# Patient Record
Sex: MALE | Race: WHITE | URBAN - METROPOLITAN AREA
[De-identification: names, ages, dates, MRNs, and addresses within clinical notes are randomized per-mention and may not be internally consistent; named-entity substitution may affect disease eponyms.]

---

## 2017-01-23 ENCOUNTER — PRE VISIT (OUTPATIENT)
Dept: CARDIOLOGY | Facility: CLINIC | Age: 52
End: 2017-01-23

## 2017-01-25 ENCOUNTER — OFFICE VISIT (OUTPATIENT)
Dept: CARDIOLOGY | Facility: CLINIC | Age: 52
End: 2017-01-25
Payer: COMMERCIAL

## 2017-01-25 VITALS
HEIGHT: 70 IN | BODY MASS INDEX: 45.1 KG/M2 | SYSTOLIC BLOOD PRESSURE: 138 MMHG | HEART RATE: 70 BPM | DIASTOLIC BLOOD PRESSURE: 72 MMHG | WEIGHT: 315 LBS

## 2017-01-25 DIAGNOSIS — I48.19 PERSISTENT ATRIAL FIBRILLATION (H): ICD-10-CM

## 2017-01-25 DIAGNOSIS — I51.9 DECREASED LEFT VENTRICULAR FUNCTION: ICD-10-CM

## 2017-01-25 DIAGNOSIS — I42.9 CARDIOMYOPATHY (H): Primary | ICD-10-CM

## 2017-01-25 PROCEDURE — 93000 ELECTROCARDIOGRAM COMPLETE: CPT | Performed by: INTERNAL MEDICINE

## 2017-01-25 PROCEDURE — 99214 OFFICE O/P EST MOD 30 MIN: CPT | Performed by: INTERNAL MEDICINE

## 2017-01-25 NOTE — PROGRESS NOTES
HISTORY OF PRESENT ILLNESS:    It was my pleasure seeing Mr. Michael Murphy in follow-up of atrial fibrillation, typical atrial flutter and tachycardia-induced cardiomyopathy.  He is a very pleasant 51-year-old male who also has history of DVT/PE related to factor V Leiden mutation and severe sleep apnea treated with CPAP.  He is severely overweight.      Because of the occurrence of tachycardia-induced cardiomyopathy with inability to maintain normal rhythm even on amiodarone therapy, he underwent catheter ablation in early 12/2016.  He had pulmonary vein isolation and right atrial isthmus ablation.  He was kept on amiodarone following the procedure.  He was started on carvedilol following the procedure at 12.5 mg b.i.d.  When Alexandra saw him in follow-up on 12/14/17, he was bradycardic in the 40s and the dose was halved to 6.25 mg bid.  Michael has remained on warfarin for anticoagulation.      In coming in today, Michael said he has felt well since the ablation.  He has had a few instances where he felt his heart may have been out of rhythm, typically lasting from seconds up to 2-3 minutes.  He has not had long episodes that he knows of.  His energy level has improved.  Lower extremity edema has improved as well.      He has no cough, unusual dyspnea, difficulty swallowing, fevers, or other concerning symptoms.      PHYSICAL EXAMINATION:   VITAL SIGNS:  Blood pressure 138/72, pulse 60 and regular, weight 154 kg.  Height 178 cm.   GENERAL:  He is a significantly overweight, pleasant male in no apparent distress.   HEENT:  Normocephalic, atraumatic.  Sclerae are anicteric.  Mouth, oropharynx clear.   NECK:  Supple, without apparent bruits.   LUNGS:  Completely clear.   CARDIOVASCULAR:  Regular rhythm.  No gallop, murmur or rub.   ABDOMEN:  Very obese, soft, nontender.   EXTREMITIES:  No more than trace edema.      DIAGNOSTIC STUDIES:    His 12-lead ECG today showed sinus rhythm at 58, small Q-waves in the inferior leads,  atrial conduction abnormality.      He informed me that his latest INR was 2.6.      IMPRESSION:    1. Atrial tachyarrhythmias with tachycardia-induced cardiomyopathy.  So far Michael has done well since his ablation procedure with no apparent complication.  He remains on amiodarone which was started in mid 2016.  Obviously, my desire is to keep him on amiodarone as briefly as possible given his young age and the multiple potential toxicities of this medication.  Currently, he is still in the healing phase following ablation.  Plan to repeat an echocardiogram in early March; if his ejection fraction has improved, switch amiodarone to flecainide.      RECOMMENDATIONS:   A.  Limited echocardiogram to reassess LV function has been requested for early 2017.   B.  I plan to see him in the clinic soon thereafter and discuss a change in his antiarrhythmic medications.   C.  Continue warfarin.   D.  For his cardiomyopathy, continue carvedilol and losartan.  His dose of carvedilol will not be increased because of previous bradycardia on a higher dose.   E.  Very important to lose weight and start a regular exercise program.  Without risk factor management he will not stay in normal rhythm long-term.       It was my pleasure seeing Mr. Wells today.      Time spent in clinic was 25 minutes, with greater than 50 percent of the time spent in discussion.         JOE VALERO MD, [facility]        cc:   [place] [address]          D: 2017 08:19   T: 2017 11:01   MT: MARIE      Name:     MICHAEL WELLS   MRN:      [id number]        Account:      [id number]   :      1965           Service Date: 2017      Document: [id number]

## 2017-01-25 NOTE — MR AVS SNAPSHOT
After Visit Summary   1/25/2017    Michael Murphy    MRN: [id number]           Patient Information     Date Of Birth          1965        Visit Information        Provider Department      1/25/2017 7:45 AM Nathan Hooper MD [facility]ART [facility]        Today's Diagnoses     Cardiomyopathy (H)    -  1     Decreased left ventricular function-EF 20-25% per echo 8-1-2016         Persistent atrial fibrillation (H)            Follow-ups after your visit        Additional Services     Follow-Up with Electrophysiologist                 Your next 10 appointments already scheduled     Mar 17, 2017  3:00 PM   Ech Limited with [facility] ([facility])    [address]  [facility] [address] [phone]           1.  Please bring or wear a comfortable two-piece outfit. 2.  You may eat, drink and take your normal medicines. 3.  For any questions that cannot be answered, please contact the ordering physician ***Please check-in at the [place] Registration Office located in [address] in the [facility] building. When you are finished registering, please go to [address] and have a seat. The technician will call your name for the test.            Mar 22, 2017  3:45 PM   [facility] EP RETURN with Nathan Hooper MD   [facility] HEART [facility] ([facility])    [address]  [facility] [address] [phone]              Future tests that were ordered for you today     Open Future Orders        Priority Expected Expires Ordered    Follow-Up with Electrophysiologist Routine 3/22/2017 1/25/2018 1/25/2017    EKG 12-lead complete w/read - Clinics Routine 3/22/2017 1/25/2018 1/25/2017    Echocardiogram Limited Routine 3/9/2017 1/25/2018 1/25/2017            Who to contact     If you have questions or need follow up information  "about today's clinic visit or your schedule please contact [facility] [place] directly at [phone].  Normal or non-critical lab and imaging results will be communicated to you by [employer], letter or phone within 4 business days after the clinic has received the results. If you do not hear from us within 7 days, please contact the clinic through [employer] or phone. If you have a critical or abnormal lab result, we will notify you by phone as soon as possible.  Submit refill requests through [email or web address] or call your pharmacy and they will forward the refill request to us. Please allow 3 business days for your refill to be completed.          Additional Information About Your Visit        [email or web address] Information     [email or web address] gives you secure access to your electronic health record. If you see a primary care provider, you can also send messages to your care team and make appointments. If you have questions, please call your primary care clinic.  If you do not have a primary care provider, please call [phone] and they will assist you.        Care EveryWhere ID     This is your Care EveryWhere ID. This could be used by other organizations to access your [facility] medical records  [id number]        Your Vitals Were     Pulse Height BMI (Body Mass Index)             70 1.778 m (5' 10\") 48.78 kg/m2          Blood Pressure from Last 3 Encounters:   01/25/17 138/72   12/14/16 122/80   12/06/16 129/63    Weight from Last 3 Encounters:   01/25/17 154.223 kg (340 lb)   12/14/16 153.588 kg (338 lb 9.6 oz)   12/06/16 157.353 kg (346 lb 14.4 oz)              We Performed the Following     EKG 12-lead complete w/read - Clinics (performed today)          Today's Medication Changes          These changes are accurate as of: 1/25/17  8:19 AM.  If you have any questions, ask your nurse or doctor.               Stop taking these medicines if you haven't already. Please contact your care team if you have " questions.     omeprazole 20 MG CR capsule   Commonly known as:  priLOSEC   Stopped by:  Nathan Hooper MD                    Primary Care Provider Office Phone # Fax #    Pacheco Andino [phone]       [facility] [address]  [place] [address]        Thank you!     Thank you for choosing [facility] AT [place]  for your care. Our goal is always to provide you with excellent care. Hearing back from our patients is one way we can continue to improve our services. Please take a few minutes to complete the written survey that you may receive in the mail after your visit with us. Thank you!             Your Updated Medication List - Protect others around you: Learn how to safely use, store and throw away your medicines at www.disposemymeds.org.          This list is accurate as of: 1/25/17  8:19 AM.  Always use your most recent med list.                   Brand Name Dispense Instructions for use    amiodarone 200 MG tablet    PACERONE/CODARONE     Take 1 tablet (200 mg) by mouth daily       carvedilol 6.25 MG tablet    COREG    180 tablet    Take 1 tablet (6.25 mg) by mouth 2 times daily (with meals)       furosemide 20 MG tablet    LASIX     Take 20 mg by mouth daily       losartan 25 MG tablet    COZAAR    90 tablet    Take 1 tablet (25 mg) by mouth daily       metFORMIN 500 MG tablet    GLUCOPHAGE    60 tablet    Take 1 tablet (500 mg) by mouth daily (with breakfast) Take 2 tabs (1000 mg) with supper       order for DME      DREAMSTATION   9-11 CM H20 FULL FACE AIRFIT F10 M       warfarin 1 MG tablet    COUMADIN    30 tablet    Take 10 mg on Monday and Thursday and 7.5 mg all other days.  INR check on 12/9.

## 2017-01-25 NOTE — LETTER
1/25/2017    Pacheco Andino MD  [facility]   [address] [facility] [address]    RE: Michael Murphy       Dear Colleague,    It was my pleasure seeing Mr. Michael Murphy in follow-up of atrial fibrillation, typical atrial flutter and tachycardia-induced cardiomyopathy.  Rei is a very pleasant 51-year-old male who also has history of DVT/PE related to factor V Leiden mutation and severe sleep apnea treated with CPAP.  He is severely overweight.      Because of the occurrence of tachycardia-induced cardiomyopathy with inability to maintain normal rhythm even on amiodarone therapy, he underwent catheter ablation in early 12/2016.  He had pulmonary vein isolation and right atrial isthmus ablation.  He was kept on amiodarone following the procedure.  He was started on carvedilol following the procedure at 12.5 mg b.i.d.  When Alexandra saw him in follow-up on 12/14/17, he was bradycardic in the 40s and the dose was halved to 6.25 mg bid.  Michael has remained on warfarin for anticoagulation.      In coming in today, Michael said he has felt well since the ablation.  He has had a few instances where he felt his heart may have been out of rhythm, typically lasting from seconds up to 2-3 minutes.  He has not had long episodes that he knows of.  His energy level has improved.  Lower extremity edema has improved as well.      He has no cough, unusual dyspnea, difficulty swallowing, fevers, or other concerning symptoms.      PHYSICAL EXAMINATION:   VITAL SIGNS:  Blood pressure 138/72, pulse 60 and regular, weight 154 kg.  Height 178 cm.   GENERAL:  He is a significantly overweight, pleasant male in no apparent distress.   HEENT:  Normocephalic, atraumatic.  Sclerae are anicteric.  Mouth, oropharynx clear.   NECK:  Supple, without apparent bruits.   LUNGS:  Completely clear.   CARDIOVASCULAR:  Regular rhythm.  No gallop, murmur or rub.   ABDOMEN:  Very obese, soft, nontender.   EXTREMITIES:  No more than trace edema.       DIAGNOSTIC STUDIES:    His 12-lead ECG today showed sinus rhythm at 58, small Q-waves in the inferior leads, atrial conduction abnormality.      He informed me that his latest INR was 2.6.      Outpatient Encounter Prescriptions as of 1/25/2017   Medication Sig Dispense Refill     carvedilol (COREG) 6.25 MG tablet Take 1 tablet (6.25 mg) by mouth 2 times daily (with meals) 180 tablet 3     amiodarone (PACERONE/CODARONE) 200 MG tablet Take 1 tablet (200 mg) by mouth daily       warfarin (COUMADIN) 1 MG tablet Take 10 mg on Monday and Thursday and 7.5 mg all other days.  INR check on 12/9. 30 tablet      furosemide (LASIX) 20 MG tablet Take 20 mg by mouth daily        losartan (COZAAR) 25 MG tablet Take 1 tablet (25 mg) by mouth daily 90 tablet 3     metFORMIN (GLUCOPHAGE) 500 MG tablet Take 1 tablet (500 mg) by mouth daily (with breakfast) Take 2 tabs (1000 mg) with supper 60 tablet      [DISCONTINUED] omeprazole (PRILOSEC) 20 MG CR capsule Take 1 capsule (20 mg) by mouth daily 30 capsule 0     order for DME DREAMSTATION     9-11 CM H20  FULL FACE AIRFIT F10 M       No facility-administered encounter medications on file as of 1/25/2017.      IMPRESSION:    1. Atrial tachyarrhythmias with tachycardia-induced cardiomyopathy.  So far Michael has done well since his ablation procedure with no apparent complication.  He remains on amiodarone which was started in mid 08/2016.  Obviously, my desire is to keep him on amiodarone as briefly as possible given his young age and the multiple potential toxicities of this medication.  Currently, he is still in the healing phase following ablation.  Plan to repeat an echocardiogram in early March; if his ejection fraction has improved, switch amiodarone to flecainide.      RECOMMENDATIONS:   A.  Limited echocardiogram to reassess LV function has been requested for early 03/2017.   B.  I plan to see him in the clinic soon thereafter and discuss a change in his antiarrhythmic  medications.   C.  Continue warfarin.   D.  For his cardiomyopathy, continue carvedilol and losartan.  His dose of carvedilol will not be increased because of previous bradycardia on a higher dose.   E.  Very important to lose weight and start a regular exercise program.  Without risk factor management he will not stay in normal rhythm long-term.       It was my pleasure seeing Mr. Murphy today.      Time spent in clinic was 25 minutes, with greater than 50 percent of the time spent in discussion.     Sincerely,    Nathan Hooper MD     [facility]

## 2017-01-25 NOTE — PROGRESS NOTES
HPI and Plan:   See dictation    Orders Placed This Encounter   Procedures     Follow-Up with Electrophysiologist     EKG 12-lead complete w/read - Clinics (performed today)     EKG 12-lead complete w/read - Clinics     Echocardiogram Limited       No orders of the defined types were placed in this encounter.       Medications Discontinued During This Encounter   Medication Reason     omeprazole (PRILOSEC) 20 MG CR capsule          Encounter Diagnoses   Name Primary?     Cardiomyopathy (H) Yes     Decreased left ventricular function-EF 20-25% per echo 8-1-2016      Persistent atrial fibrillation (H)        CURRENT MEDICATIONS:  Current Outpatient Prescriptions   Medication Sig Dispense Refill     carvedilol (COREG) 6.25 MG tablet Take 1 tablet (6.25 mg) by mouth 2 times daily (with meals) 180 tablet 3     amiodarone (PACERONE/CODARONE) 200 MG tablet Take 1 tablet (200 mg) by mouth daily       warfarin (COUMADIN) 1 MG tablet Take 10 mg on Monday and Thursday and 7.5 mg all other days.  INR check on 12/9. 30 tablet      furosemide (LASIX) 20 MG tablet Take 20 mg by mouth daily        losartan (COZAAR) 25 MG tablet Take 1 tablet (25 mg) by mouth daily 90 tablet 3     metFORMIN (GLUCOPHAGE) 500 MG tablet Take 1 tablet (500 mg) by mouth daily (with breakfast) Take 2 tabs (1000 mg) with supper 60 tablet      order for DME DREAMSTATION     9-11 CM H20  FULL FACE AIRFIT F10 M         ALLERGIES     Allergies   Allergen Reactions     Cephalosporins Rash       PAST MEDICAL HISTORY:  Past Medical History   Diagnosis Date     Cardiomyopathy (H)      Paroxysmal atrial fibrillation (H) 2007     Obesity      Decreased left ventricular function-EF 20-25% per echo 8-1-2016 8/8/2016     Factor V Leiden (H) 8/8/2016     Abnormal pulmonary function test-Hx of abnormal sleep test 8/8/2016     DVT (deep vein thrombosis) in pregnancy (H)      Pulmonary emboli (H) 2011     Atrial fibrillation (H) 12-5-2016     PVI ablation and RA isthmus  ablation     Sleep apnea        PAST SURGICAL HISTORY:  Past Surgical History   Procedure Laterality Date     Cardioversion  8/19/16     failed     Cardioversion  9/27/16     H ablation focal afib  12/5/16       FAMILY HISTORY:  Family History   Problem Relation Age of Onset     HEART DISEASE Mother      stent placed     HEART DISEASE Father      bypass x4     CANCER Father      lung       SOCIAL HISTORY:  Social History     Social History     Marital Status:      Spouse Name: N/A     Number of Children: N/A     Years of Education: N/A     Social History Main Topics     Smoking status: Never Smoker      Smokeless tobacco: None     Alcohol Use: Yes      Comment: occ     Drug Use: None     Sexual Activity: Not Asked     Other Topics Concern     Parent/Sibling W/ Cabg, Mi Or Angioplasty Before 65f 55m? No     Caffeine Concern No     occ. pop/tea daily     Sleep Concern No     Stress Concern Yes     Weight Concern Yes     trying to lose weight     Special Diet No     Exercise Yes     20-40 min, walking on treadmill daily     Seat Belt Yes     Social History Narrative       Review of Systems:  Skin:          Eyes:  Positive for glasses    ENT:  Negative for      Respiratory:  Positive for sleep apnea;CPAP     Cardiovascular:    palpitations;Positive for    Gastroenterology: Negative for      Genitourinary:         Musculoskeletal:         Neurologic:  Negative for      Psychiatric:  Negative for      Heme/Lymph/Imm:  Negative for      Endocrine:  Positive for   pre diabetic    [id number]

## 2017-03-09 LAB
ALBUMIN SERPL-MCNC: 3.9 G/DL
ALP SERPL-CCNC: 57 U/L
ALT SERPL-CCNC: 34 U/L
ANION GAP SERPL CALCULATED.3IONS-SCNC: ABNORMAL MMOL/L
AST SERPL-CCNC: 27 U/L
BILIRUB SERPL-MCNC: 0.8 MG/DL
BUN SERPL-MCNC: 15 MG/DL
CALCIUM SERPL-MCNC: ABNORMAL MG/DL
CHLORIDE SERPLBLD-SCNC: 97 MMOL/L
CO2 SERPL-SCNC: 26 MMOL/L
CREAT SERPL-MCNC: 1 MG/DL
GFR SERPL CREATININE-BSD FRML MDRD: ABNORMAL ML/MIN/1.73M2
GLUCOSE SERPL-MCNC: 107 MG/DL (ref 70–99)
POTASSIUM SERPL-SCNC: 4 MMOL/L
PROT SERPL-MCNC: 7.7 G/DL
SODIUM SERPL-SCNC: 137 MMOL/L

## 2017-03-14 ENCOUNTER — PRE VISIT (OUTPATIENT)
Dept: CARDIOLOGY | Facility: CLINIC | Age: 52
End: 2017-03-14

## 2017-03-17 ENCOUNTER — HOSPITAL ENCOUNTER (OUTPATIENT)
Dept: CARDIOLOGY | Facility: CLINIC | Age: 52
Discharge: HOME | End: 2017-03-17
Attending: INTERNAL MEDICINE | Admitting: INTERNAL MEDICINE
Payer: COMMERCIAL

## 2017-03-17 DIAGNOSIS — I48.19 PERSISTENT ATRIAL FIBRILLATION (H): ICD-10-CM

## 2017-03-17 PROCEDURE — 25500064 ZZH RX 255 OP 636: Performed by: INTERNAL MEDICINE

## 2017-03-17 PROCEDURE — 93325 DOPPLER ECHO COLOR FLOW MAPG: CPT | Mod: 26 | Performed by: INTERNAL MEDICINE

## 2017-03-17 PROCEDURE — 40000264 ECHO LIMITED WITH OPTISON

## 2017-03-17 PROCEDURE — 93308 TTE F-UP OR LMTD: CPT | Mod: 26 | Performed by: INTERNAL MEDICINE

## 2017-03-17 PROCEDURE — 93321 DOPPLER ECHO F-UP/LMTD STD: CPT | Mod: 26 | Performed by: INTERNAL MEDICINE

## 2017-03-17 RX ADMIN — HUMAN ALBUMIN MICROSPHERES AND PERFLUTREN 3 ML: 10; .22 INJECTION, SOLUTION INTRAVENOUS at 15:45

## 2017-03-22 ENCOUNTER — OFFICE VISIT (OUTPATIENT)
Dept: CARDIOLOGY | Facility: CLINIC | Age: 52
End: 2017-03-22
Payer: COMMERCIAL

## 2017-03-22 VITALS
DIASTOLIC BLOOD PRESSURE: 88 MMHG | WEIGHT: 315 LBS | BODY MASS INDEX: 45.1 KG/M2 | HEART RATE: 52 BPM | HEIGHT: 70 IN | SYSTOLIC BLOOD PRESSURE: 140 MMHG

## 2017-03-22 DIAGNOSIS — I48.19 PERSISTENT ATRIAL FIBRILLATION (H): ICD-10-CM

## 2017-03-22 DIAGNOSIS — I48.3 TYPICAL ATRIAL FLUTTER (H): Primary | ICD-10-CM

## 2017-03-22 DIAGNOSIS — I42.9 CARDIOMYOPATHY (H): ICD-10-CM

## 2017-03-22 PROCEDURE — 99214 OFFICE O/P EST MOD 30 MIN: CPT | Mod: 25 | Performed by: INTERNAL MEDICINE

## 2017-03-22 PROCEDURE — 93000 ELECTROCARDIOGRAM COMPLETE: CPT | Performed by: INTERNAL MEDICINE

## 2017-03-22 RX ORDER — FLECAINIDE ACETATE 100 MG/1
100 TABLET ORAL 2 TIMES DAILY
Qty: 180 TABLET | Refills: 3 | Status: SHIPPED | OUTPATIENT
Start: 2017-03-22 | End: 2018-02-26

## 2017-03-22 RX ORDER — SIMVASTATIN 40 MG
40 TABLET ORAL AT BEDTIME
COMMUNITY

## 2017-03-22 RX ORDER — SIMVASTATIN 40 MG
TABLET ORAL
Refills: 5 | COMMUNITY
Start: 2017-02-27 | End: 2017-03-22

## 2017-03-22 NOTE — PROGRESS NOTES
HPI and Plan:   See dictation    Orders Placed This Encounter   Procedures     Follow-Up with Electrophysiologist     Cholo Patch Monitor       Orders Placed This Encounter   Medications     DISCONTD: simvastatin (ZOCOR) 40 MG tablet     Sig: TK 1 T PO QHS     Refill:  5     simvastatin (ZOCOR) 40 MG tablet     Sig: Take 40 mg by mouth At Bedtime     flecainide (TAMBOCOR) 100 MG tablet     Sig: Take 1 tablet (100 mg) by mouth 2 times daily     Dispense:  180 tablet     Refill:  3     Start on 3/25/17       Medications Discontinued During This Encounter   Medication Reason     simvastatin (ZOCOR) 40 MG tablet Erroneous Entry     amiodarone (PACERONE/CODARONE) 200 MG tablet          Encounter Diagnosis   Name Primary?     Persistent atrial fibrillation (H)        CURRENT MEDICATIONS:  Current Outpatient Prescriptions   Medication Sig Dispense Refill     simvastatin (ZOCOR) 40 MG tablet Take 40 mg by mouth At Bedtime       flecainide (TAMBOCOR) 100 MG tablet Take 1 tablet (100 mg) by mouth 2 times daily 180 tablet 3     carvedilol (COREG) 6.25 MG tablet Take 1 tablet (6.25 mg) by mouth 2 times daily (with meals) 180 tablet 3     warfarin (COUMADIN) 1 MG tablet Take 10 mg on Monday and Thursday and 7.5 mg all other days.  INR check on 12/9. 30 tablet      furosemide (LASIX) 20 MG tablet Take 20 mg by mouth daily        losartan (COZAAR) 25 MG tablet Take 1 tablet (25 mg) by mouth daily 90 tablet 3     metFORMIN (GLUCOPHAGE) 500 MG tablet Take 1 tablet (500 mg) by mouth daily (with breakfast) Take 2 tabs (1000 mg) with supper 60 tablet      order for DME DREAMSTATION     9-11 CM H20  FULL FACE AIRFIT F10 M         ALLERGIES     Allergies   Allergen Reactions     Cephalosporins Rash       PAST MEDICAL HISTORY:  Past Medical History:   Diagnosis Date     Abnormal pulmonary function test-Hx of abnormal sleep test 8/8/2016     Atrial fibrillation (H) 12-5-2016    PVI ablation and RA isthmus ablation     Cardiomyopathy (H)       Decreased left ventricular function-EF 20-25% per echo 8-1-2016 8/8/2016     DVT (deep vein thrombosis) in pregnancy (H)      Factor V Leiden (H) 8/8/2016     Obesity      Paroxysmal atrial fibrillation (H) 2007     Pulmonary emboli (H) 2011     Sleep apnea        PAST SURGICAL HISTORY:  Past Surgical History:   Procedure Laterality Date     CARDIOVERSION  8/19/16    failed     CARDIOVERSION  9/27/16     H ABLATION FOCAL AFIB  12/5/16       FAMILY HISTORY:  Family History   Problem Relation Age of Onset     HEART DISEASE Mother      stent placed     HEART DISEASE Father      bypass x4     CANCER Father      lung       SOCIAL HISTORY:  Social History     Social History     Marital status:      Spouse name: N/A     Number of children: N/A     Years of education: N/A     Social History Main Topics     Smoking status: Never Smoker     Smokeless tobacco: Not on file     Alcohol use Yes      Comment: occ     Drug use: Not on file     Sexual activity: Not on file     Other Topics Concern     Parent/Sibling W/ Cabg, Mi Or Angioplasty Before 65f 55m? No     Caffeine Concern No     occ. pop/tea daily     Sleep Concern No     Stress Concern Yes     Weight Concern Yes     trying to lose weight     Special Diet No     Exercise Yes     20-40 min, walking on treadmill daily     Seat Belt Yes     Social History Narrative       Review of Systems:  Skin:  Negative       Eyes:  Positive for glasses    ENT:  Negative      Respiratory:  Positive for sleep apnea;CPAP     Cardiovascular:    Positive for;palpitations AFIB  Gastroenterology: Negative      Genitourinary:  Negative      Musculoskeletal:  Negative      Neurologic:  Negative      Psychiatric:  Negative      Heme/Lymph/Imm:  Positive for allergies    Endocrine:  Positive for   pre diabetic    [id number]

## 2017-03-22 NOTE — LETTER
3/22/2017    Pacheco Andino MD  [facility]   [address] [facility] [address]    RE: Michael Murphy       Dear Colleague,    It was my pleasure seeing Mr. Michael Murphy in follow-up of atrial fibrillation, typical atrial flutter and tachycardia-induced cardiomyopathy.  Michael is a pleasant 51-year-old male with history of DVT/PE related to factor V Leiden mutation, severe obesity and sleep apnea treated with CPAP.      Because of rapid AF associated with tachycardia-induced cardiomyopathy and inability to maintain normal rhythm on amiodarone therapy, he underwent biatrial catheter ablation in 12/2016.  Since then, he has been maintained on amiodarone 200 mg daily.  We also started carvedilol 12.5 mg b.i.d. following the procedure but because of bradycardia, this was later decreased to 6.25 mg b.i.d.      In coming in today, Michael said he felt well.  However, he does have nearly daily palpitations, sometimes for seconds, sometimes longer, up to 30 minutes.  He is not sure if this is atrial fibrillation but he thinks it may be.  He has not had syncope or near-syncope.  His stamina and energy have vastly improved compared to late 2016.      PHYSICAL EXAMINATION:   VITAL SIGNS:  Blood pressure 140/80, pulse 52 and regular, weight 160 kg, height 178 cm.   GENERAL:  He is a severely overweight, pleasant man in no apparent distress.   NECK:  Supple without bruits.   LUNGS:  Clear.   CARDIOVASCULAR:  Regular rhythm.  No gallop, murmur or rub.   EXTREMITIES:  No edema.      DIAGNOSTIC STUDIES:    His 12-lead ECG today showed sinus bradycardia at 46, otherwise normal tracing.    His echocardiogram from 03/17/2017 showed normalization of LV function with EF of 55% to 60%.  Technically limited study.     Outpatient Encounter Prescriptions as of 3/22/2017   Medication Sig Dispense Refill     simvastatin (ZOCOR) 40 MG tablet Take 40 mg by mouth At Bedtime       flecainide (TAMBOCOR) 100 MG tablet Take 1 tablet (100  mg) by mouth 2 times daily 180 tablet 3     carvedilol (COREG) 6.25 MG tablet Take 1 tablet (6.25 mg) by mouth 2 times daily (with meals) 180 tablet 3     warfarin (COUMADIN) 1 MG tablet Take 10 mg on Monday and Thursday and 7.5 mg all other days.  INR check on 12/9. 30 tablet      furosemide (LASIX) 20 MG tablet Take 20 mg by mouth daily        losartan (COZAAR) 25 MG tablet Take 1 tablet (25 mg) by mouth daily 90 tablet 3     metFORMIN (GLUCOPHAGE) 500 MG tablet Take 1 tablet (500 mg) by mouth daily (with breakfast) Take 2 tabs (1000 mg) with supper 60 tablet      order for DME DREAMSTATION     9-11 CM H20  FULL FACE AIRFIT F10 M       [DISCONTINUED] simvastatin (ZOCOR) 40 MG tablet TK 1 T PO QHS  5     [DISCONTINUED] amiodarone (PACERONE/CODARONE) 200 MG tablet Take 1 tablet (200 mg) by mouth daily       No facility-administered encounter medications on file as of 3/22/2017.       IMPRESSION:    1.  Atrial tachyarrhythmias and tachycardia-induced cardiomyopathy.  I am delighted that Michael's EF has normalized.  He has been on amiodarone since 08/2016 and it is now time to stop it as he is too young to be committed to this drug long-term.  His current symptoms suggest residual paroxysmal AF, though we have not yet documented this.       Since his EF has normalized, I will restart flecainide, a medication he had previously tolerated.  One concern is his low heart rate, which was only in the mid 40s today.  In order to keep him on flecainide we may have to decrease or stop carvedilol, which has been beneficial for his hypertension.     RECOMMENDATIONS:   A.  Stop amiodarone.   B.  In 4 days, start flecainide 100 mg b.i.d., a dose he had previously tolerated.   C.  Continue warfarin.   D.  I asked him to keep a close eye on his heart rate by using his pulse oximetry regularly.  He was advised to call us if his heart rate drops to the low 40s or if he feels unwell.  E.  A 14-day Zio Patch cardiac monitor has been  ordered in 2 weeks.     F.  Return to the EP clinic in 3 months.   G.  I emphasized the importance of weight loss.                  It was my pleasure seeing Mr. Murphy.  Please feel free to contact me with questions or concerns.      Sincerely,    Nathan Hooper MD     [facility]

## 2017-03-22 NOTE — MR AVS SNAPSHOT
After Visit Summary   3/22/2017    Michael Murphy    MRN: [id number]           Patient Information     Date Of Birth          1965        Visit Information        Provider Department      3/22/2017 3:45 PM Nathan Hooper MD [facility]        Today's Diagnoses     Persistent atrial fibrillation (H)           Follow-ups after your visit        Additional Services     Follow-Up with Electrophysiologist                 Your next 10 appointments already scheduled     Apr 12, 2017  3:15 PM CDT   ZIOPATCH MONITOR with [facility] DEVICE [facility] ([facility])    [address]  [facility] [address]   [phone]           LOCATION - [address] [facility], [address] [place] [address] **Please check-in at the [place] Registration Office, [address], in the [facility] building. When you are finished registering, please go to [address] and have a seat.            Jun 14, 2017  4:15 PM CDT   [facility] EP RETURN with Nathan Hooper MD   [facility] AT [place] ([facility])    [address]  [facility] [id number] [phone]              Future tests that were ordered for you today     Open Future Orders        Priority Expected Expires Ordered    Follow-Up with Electrophysiologist Routine 6/20/2017 3/22/2018 3/22/2017    Zio Patch Monitor Routine 4/10/2017 3/22/2018 3/22/2017            Who to contact     If you have questions or need follow up information about today's clinic visit or your schedule please contact [facility] directly at [phone].  Normal or non-critical lab and imaging results will be communicated to you by MyChart, letter or phone within 4 business days after the clinic has received the results. If you do not hear from us within 7 days, please contact  "the clinic through Corrext or phone. If you have a critical or abnormal lab result, we will notify you by phone as soon as possible.  Submit refill requests through Simmr or call your pharmacy and they will forward the refill request to us. Please allow 3 business days for your refill to be completed.          Additional Information About Your Visit        [employer] Information     Simmr gives you secure access to your electronic health record. If you see a primary care provider, you can also send messages to your care team and make appointments. If you have questions, please call your primary care clinic.  If you do not have a primary care provider, please call [phone] and they will assist you.        Care EveryWhere ID     This is your Care EveryWhere ID. This could be used by other organizations to access your [place] medical records  [id number]        Your Vitals Were     Pulse Height BMI (Body Mass Index)             52 1.778 m (5' 10\") 50.55 kg/m2          Blood Pressure from Last 3 Encounters:   03/22/17 140/88   01/25/17 138/72   12/14/16 122/80    Weight from Last 3 Encounters:   03/22/17 (!) 159.8 kg (352 lb 4.8 oz)   01/25/17 (!) 154.2 kg (340 lb)   12/14/16 (!) 153.6 kg (338 lb 9.6 oz)              We Performed the Following     EKG 12-lead complete w/read - Clinics     Follow-Up with Electrophysiologist          Today's Medication Changes          These changes are accurate as of: 3/22/17  4:15 PM.  If you have any questions, ask your nurse or doctor.               Start taking these medicines.        Dose/Directions    flecainide 100 MG tablet   Commonly known as:  TAMBOCOR   Used for:  Persistent atrial fibrillation (H)        Dose:  100 mg   Take 1 tablet (100 mg) by mouth 2 times daily   Quantity:  180 tablet   Refills:  3         Stop taking these medicines if you haven't already. Please contact your care team if you have questions.     amiodarone 200 MG tablet   Commonly known as:  " PACERONE/CODARONE                Where to get your medicines      These medications were sent to [email or web address] [address] [address] [facility], [facility] [address]     Phone:  [phone]     flecainide 100 MG tablet                Primary Care Provider Office Phone # Fax #    Pacheco Andino [phone]       [facility] [place] [address]  [facility] [address]        Thank you!     Thank you for choosing [facility] AT [place]  for your care. Our goal is always to provide you with excellent care. Hearing back from our patients is one way we can continue to improve our services. Please take a few minutes to complete the written survey that you may receive in the mail after your visit with us. Thank you!             Your Updated Medication List - Protect others around you: Learn how to safely use, store and throw away your medicines at www.disposemymeds.org.          This list is accurate as of: 3/22/17  4:15 PM.  Always use your most recent med list.                   Brand Name Dispense Instructions for use    carvedilol 6.25 MG tablet    COREG    180 tablet    Take 1 tablet (6.25 mg) by mouth 2 times daily (with meals)       flecainide 100 MG tablet    TAMBOCOR    180 tablet    Take 1 tablet (100 mg) by mouth 2 times daily       furosemide 20 MG tablet    LASIX     Take 20 mg by mouth daily       losartan 25 MG tablet    COZAAR    90 tablet    Take 1 tablet (25 mg) by mouth daily       metFORMIN 500 MG tablet    GLUCOPHAGE    60 tablet    Take 1 tablet (500 mg) by mouth daily (with breakfast) Take 2 tabs (1000 mg) with supper       order for DME      DREAMSTATION   9-11 CM H20 FULL FACE AIRFIT F10 M       simvastatin 40 MG tablet    ZOCOR     Take 40 mg by mouth At Bedtime       warfarin 1 MG tablet    COUMADIN    30 tablet    Take 10 mg on Monday and Thursday and 7.5 mg all other days.  INR  check on 12/9.

## 2017-03-23 NOTE — PROGRESS NOTES
HISTORY OF PRESENT ILLNESS:    It was my pleasure seeing Mr. Michael Murphy in follow-up of atrial fibrillation, typical atrial flutter and tachycardia-induced cardiomyopathy.  Michael is a pleasant 51-year-old male with history of DVT/PE related to factor V Leiden mutation, severe obesity and sleep apnea treated with CPAP.      Because of rapid AF associated with tachycardia-induced cardiomyopathy and inability to maintain normal rhythm on amiodarone therapy, he underwent biatrial catheter ablation in 12/2016.  Since then, he has been maintained on amiodarone 200 mg daily.  We also started carvedilol 12.5 mg b.i.d. following the procedure but because of bradycardia, this was later decreased to 6.25 mg b.i.d.      In coming in today, Michael said he felt well.  However, he does have nearly daily palpitations, sometimes for seconds, sometimes longer, up to 30 minutes.  He is not sure if this is atrial fibrillation but he thinks it may be.  He has not had syncope or near-syncope.  His stamina and energy have vastly improved compared to late 2016.      PHYSICAL EXAMINATION:   VITAL SIGNS:  Blood pressure 140/80, pulse 52 and regular, weight 160 kg, height 178 cm.   GENERAL:  He is a severely overweight, pleasant man in no apparent distress.   NECK:  Supple without bruits.   LUNGS:  Clear.   CARDIOVASCULAR:  Regular rhythm.  No gallop, murmur or rub.   EXTREMITIES:  No edema.      DIAGNOSTIC STUDIES:    His 12-lead ECG today showed sinus bradycardia at 46, otherwise normal tracing.    His echocardiogram from 03/17/2017 showed normalization of LV function with EF of 55% to 60%.  Technically limited study.      IMPRESSION:    1.  Atrial tachyarrhythmias and tachycardia-induced cardiomyopathy.  I am delighted that Michael's EF has normalized.  He has been on amiodarone since 08/2016 and it is now time to stop it as he is too young to be committed to this drug long-term.  His current symptoms suggest residual paroxysmal AF, though we  have not yet documented this.       Since his EF has normalized, I will restart flecainide, a medication he had previously tolerated.  One concern is his low heart rate, which was only in the mid 40s today.  In order to keep him on flecainide we may have to decrease or stop carvedilol, which has been beneficial for his hypertension.     RECOMMENDATIONS:   A.  Stop amiodarone.   B.  In 4 days, start flecainide 100 mg b.i.d., a dose he had previously tolerated.   C.  Continue warfarin.   D.  I asked him to keep a close eye on his heart rate by using his pulse oximetry regularly.  He was advised to call us if his heart rate drops to the low 40s or if he feels unwell.  E.  A 14-day Zio Patch cardiac monitor has been ordered in 2 weeks.     F.  Return to the EP clinic in 3 months.   G.  I emphasized the importance of weight loss.      It was my pleasure seeing Mr. Wells.  Please feel free to contact me with questions or concerns.        JOE VALERO MD, FACC         cc:   Pacheco Andino MD   [address] [address]             D: 2017 16:19   T: 2017 14:59   MT: THERESE      Name:     TIFF WELLS   MRN:      [id number]        Account:      [id number]   :      1965           Service Date: 2017      Document: [id number]

## 2017-04-12 ENCOUNTER — HOSPITAL ENCOUNTER (OUTPATIENT)
Dept: CARDIOLOGY | Facility: CLINIC | Age: 52
Discharge: HOME | End: 2017-04-12
Attending: INTERNAL MEDICINE | Admitting: INTERNAL MEDICINE
Payer: COMMERCIAL

## 2017-04-12 DIAGNOSIS — I48.19 PERSISTENT ATRIAL FIBRILLATION (H): ICD-10-CM

## 2017-04-12 PROCEDURE — 0296T ZIO PATCH HOLTER: CPT

## 2017-04-12 PROCEDURE — 0298T ZZC EXT ECG > 48HR TO 21 DAY REVIEW AND INTERPRETATN: CPT | Performed by: INTERNAL MEDICINE

## 2017-04-12 PROCEDURE — 0296T ZZHC  EXT ECG > 48HR TO 21 DAY RCRD W/CONECT INTL RCRD: CPT | Performed by: INTERNAL MEDICINE

## 2017-04-28 ENCOUNTER — TELEPHONE (OUTPATIENT)
Dept: CARDIOLOGY | Facility: CLINIC | Age: 52
End: 2017-04-28

## 2017-04-28 NOTE — TELEPHONE ENCOUNTER
His ZP shows 17% AF burden...  In general, heart rate is OK during AF (not too fast).  Currently on flecainide.   Please call him: if he is feeling OK I will see him in June as previously requested.  If not, I would see sooner (let me know and I will squeeze him in...).   TOD Bailey     Spoke to pt to make aware of the amount of A Fib on ZioPatch, Pt who is doing well and states that he feels better than he has in awhile. Pt aware to contact A Fib nurse if any problems arise prior to June OV. Pt states understanding. JNelsonSID

## 2017-06-09 ENCOUNTER — PRE VISIT (OUTPATIENT)
Dept: CARDIOLOGY | Facility: CLINIC | Age: 52
End: 2017-06-09

## 2017-06-14 ENCOUNTER — OFFICE VISIT (OUTPATIENT)
Dept: CARDIOLOGY | Facility: CLINIC | Age: 52
End: 2017-06-14
Attending: INTERNAL MEDICINE
Payer: COMMERCIAL

## 2017-06-14 VITALS
HEIGHT: 70 IN | SYSTOLIC BLOOD PRESSURE: 132 MMHG | OXYGEN SATURATION: 95 % | WEIGHT: 315 LBS | BODY MASS INDEX: 45.1 KG/M2 | DIASTOLIC BLOOD PRESSURE: 90 MMHG | HEART RATE: 52 BPM

## 2017-06-14 DIAGNOSIS — I48.3 TYPICAL ATRIAL FLUTTER (H): Primary | ICD-10-CM

## 2017-06-14 DIAGNOSIS — E66.01 MORBID OBESITY DUE TO EXCESS CALORIES (H): ICD-10-CM

## 2017-06-14 DIAGNOSIS — I48.19 PERSISTENT ATRIAL FIBRILLATION (H): ICD-10-CM

## 2017-06-14 PROCEDURE — 99214 OFFICE O/P EST MOD 30 MIN: CPT | Mod: 25 | Performed by: INTERNAL MEDICINE

## 2017-06-14 PROCEDURE — 93000 ELECTROCARDIOGRAM COMPLETE: CPT | Performed by: INTERNAL MEDICINE

## 2017-06-14 NOTE — PROGRESS NOTES
HPI and Plan:   See dictation    Orders Placed This Encounter   Procedures     Follow-Up with Electrophysiologist     EKG 12-lead complete w/read - Clinics (performed today)       No orders of the defined types were placed in this encounter.      There are no discontinued medications.      Encounter Diagnosis   Name Primary?     Persistent atrial fibrillation (H)        CURRENT MEDICATIONS:  Current Outpatient Prescriptions   Medication Sig Dispense Refill     simvastatin (ZOCOR) 40 MG tablet Take 40 mg by mouth At Bedtime       flecainide (TAMBOCOR) 100 MG tablet Take 1 tablet (100 mg) by mouth 2 times daily 180 tablet 3     carvedilol (COREG) 6.25 MG tablet Take 1 tablet (6.25 mg) by mouth 2 times daily (with meals) 180 tablet 3     warfarin (COUMADIN) 1 MG tablet Take 10 mg on Monday and Thursday and 7.5 mg all other days.  INR check on 12/9. 30 tablet      furosemide (LASIX) 20 MG tablet Take 20 mg by mouth daily        losartan (COZAAR) 25 MG tablet Take 1 tablet (25 mg) by mouth daily 90 tablet 3     metFORMIN (GLUCOPHAGE) 500 MG tablet Take 1 tablet (500 mg) by mouth daily (with breakfast) Take 2 tabs (1000 mg) with supper 60 tablet      order for DME DREAMSTATION     9-11 CM H20  FULL FACE AIRFIT F10 M         ALLERGIES     Allergies   Allergen Reactions     Cephalosporins Rash       PAST MEDICAL HISTORY:  Past Medical History:   Diagnosis Date     Abnormal pulmonary function test-Hx of abnormal sleep test 8/8/2016     Atrial fibrillation (H) 12-5-2016    PVI ablation and RA isthmus ablation     Cardiomyopathy (H)      Decreased left ventricular function-EF 20-25% per echo 8-1-2016 8/8/2016     DVT (deep vein thrombosis) in pregnancy (H)      Factor V Leiden (H) 8/8/2016     Obesity      Paroxysmal atrial fibrillation (H) 2007     Pulmonary emboli (H) 2011     Sleep apnea        PAST SURGICAL HISTORY:  Past Surgical History:   Procedure Laterality Date     CARDIOVERSION  8/19/16    failed     CARDIOVERSION   9/27/16     H ABLATION FOCAL AFIB  12/5/16       FAMILY HISTORY:  Family History   Problem Relation Age of Onset     HEART DISEASE Mother      stent placed     HEART DISEASE Father      bypass x4     CANCER Father      lung       SOCIAL HISTORY:  Social History     Social History     Marital status:      Spouse name: N/A     Number of children: N/A     Years of education: N/A     Social History Main Topics     Smoking status: Never Smoker     Smokeless tobacco: None     Alcohol use Yes      Comment: occ     Drug use: None     Sexual activity: Not Asked     Other Topics Concern     Parent/Sibling W/ Cabg, Mi Or Angioplasty Before 65f 55m? No     Caffeine Concern No     occ. pop/tea daily     Sleep Concern No     Stress Concern Yes     Weight Concern Yes     trying to lose weight     Special Diet No     Exercise Yes     20-40 min, walking on treadmill daily     Seat Belt Yes     Social History Narrative       Review of Systems:  Skin:  Negative       Eyes:  Positive for glasses    ENT:  Negative      Respiratory:  Positive for sleep apnea;CPAP     Cardiovascular:    palpitations;Positive for;edema AFIB  Gastroenterology: Negative      Genitourinary:  Negative      Musculoskeletal:  Negative      Neurologic:  Negative      Psychiatric:  Positive for excessive stress;anxiety;depression always   Heme/Lymph/Imm:  Positive for easy bruising    Endocrine:  Positive for diabetes      [id number]

## 2017-06-14 NOTE — MR AVS SNAPSHOT
After Visit Summary   6/14/2017    Michael Murphy    MRN: [id number]           Patient Information     Date Of Birth          1965        Visit Information        Provider Department      6/14/2017 4:15 PM Nathan Hooper MD [facility]        Today's Diagnoses     Typical atrial flutter (H)    -  1    Persistent atrial fibrillation (H)        Morbid obesity due to excess calories (H)           Follow-ups after your visit        Additional Services     Follow-Up with Electrophysiologist                 Future tests that were ordered for you today     Open Future Orders        Priority Expected Expires Ordered    Follow-Up with Electrophysiologist Routine 12/11/2017 6/14/2018 6/14/2017            Who to contact     If you have questions or need follow up information about today's clinic visit or your schedule please contact [facility] directly at [phone].  Normal or non-critical lab and imaging results will be communicated to you by Railpodhart, letter or phone within 4 business days after the clinic has received the results. If you do not hear from us within 7 days, please contact the clinic through Railpodhart or phone. If you have a critical or abnormal lab result, we will notify you by phone as soon as possible.  Submit refill requests through Jumio or call your pharmacy and they will forward the refill request to us. Please allow 3 business days for your refill to be completed.          Additional Information About Your Visit        MyChart Information     Jumio gives you secure access to your electronic health record. If you see a primary care provider, you can also send messages to your care team and make appointments. If you have questions, please call your primary care clinic.  If you do not have a primary care provider, please call [phone] and they will assist you.        Care EveryWhere  "ID     This is your Care EveryWhere ID. This could be used by other organizations to access your [place] medical records  [id number]        Your Vitals Were     Pulse Height Pulse Oximetry BMI (Body Mass Index)          52 1.778 m (5' 10\") 95% 48.93 kg/m2         Blood Pressure from Last 3 Encounters:   06/14/17 132/90   03/22/17 140/88   01/25/17 138/72    Weight from Last 3 Encounters:   06/14/17 (!) 154.7 kg (341 lb)   03/22/17 (!) 159.8 kg (352 lb 4.8 oz)   01/25/17 (!) 154.2 kg (340 lb)              We Performed the Following     EKG 12-lead complete w/read - Clinics (performed today)     Follow-Up with Electrophysiologist        Primary Care Provider Office Phone # Fax #    Pacheco Andino [phone]       [facility] [address] [facility] [address]        Thank you!     Thank you for choosing [facility] PHYSICI[facility] HEART AT [place]  for your care. Our goal is always to provide you with excellent care. Hearing back from our patients is one way we can continue to improve our services. Please take a few minutes to complete the written survey that you may receive in the mail after your visit with us. Thank you!             Your Updated Medication List - Protect others around you: Learn how to safely use, store and throw away your medicines at www.disposemymeds.org.          This list is accurate as of: 6/14/17  4:48 PM.  Always use your most recent med list.                   Brand Name Dispense Instructions for use    carvedilol 6.25 MG tablet    COREG    180 tablet    Take 1 tablet (6.25 mg) by mouth 2 times daily (with meals)       flecainide 100 MG tablet    TAMBOCOR    180 tablet    Take 1 tablet (100 mg) by mouth 2 times daily       furosemide 20 MG tablet    LASIX     Take 20 mg by mouth daily       losartan 25 MG tablet    COZAAR    90 tablet    Take 1 tablet (25 mg) by mouth daily       metFORMIN 500 MG tablet    GLUCOPHAGE    60 tablet    Take 1 tablet " (500 mg) by mouth daily (with breakfast) Take 2 tabs (1000 mg) with supper       order for DME      DREAMSTATION   9-11 CM H20 FULL FACE AIRFIT F10 M       simvastatin 40 MG tablet    ZOCOR     Take 40 mg by mouth At Bedtime       warfarin 1 MG tablet    COUMADIN    30 tablet    Take 10 mg on Monday and Thursday and 7.5 mg all other days.  INR check on 12/9.

## 2017-06-14 NOTE — LETTER
6/14/2017    Pacheco Andino MD  [facility]   [address] [facility] [address]    RE: Michael Murphy       Dear Colleague,    I had the pleasure of seeing Michael Murphy in the [facility].    It was my pleasure seeing Mr. Michael Murphy in follow-up of atrial fibrillation, typical atrial flutter and tachycardia-induced cardiomyopathy.  Michael is a pleasant 52-year-old male with history of DVT/PE due to factor V Leiden mutation, severe obesity and sleep apnea treated with CPAP.      He underwent catheter ablation for atrial fibrillation and atrial flutter in 12/2016.  He has had residual arrhythmia and according to a ZioPatch last March he had both atrial fibrillation as well as atrial flutter (not clear if his flutter is typical or atypical).  The ventricular rate during atrial arrhythmias was not particularly fast, however.  He is currently on flecainide 100 mg b.i.d., carvedilol, warfarin, losartan.      In coming in today, Michael tells me that he feels even better now compared to March and he thinks that his arrhythmias have significantly settled down.  He believes he rarely has an episode of arrhythmia going over a few minutes.  His energy level has improved.  He remains compliant with his CPAP therapy.  Unfortunately, he has been unable to lose weight.      PHYSICAL EXAMINATION:   VITAL SIGNS:  Blood pressure 130/90, pulse 52 and regular, weight 155 kg, height 178 cm.      GENERAL:  Michael is very pleasant as always and remains severely overweight.   NECK:  Thick, supple, without carotid bruits.   LUNGS:  Clear.   CARDIOVASCULAR:  I cannot appreciate his jugular venous pressure.  The rhythm is regular.  There is no gallop or murmur.   ABDOMEN:  Severely obese.   EXTREMITIES:  No significant edema.      DIAGNOSTIC STUDIES:  His 12-lead ECG today showed sinus bradycardia.  Otherwise normal.     Outpatient Encounter Prescriptions as of 6/14/2017   Medication Sig Dispense  Refill     simvastatin (ZOCOR) 40 MG tablet Take 40 mg by mouth At Bedtime       flecainide (TAMBOCOR) 100 MG tablet Take 1 tablet (100 mg) by mouth 2 times daily 180 tablet 3     carvedilol (COREG) 6.25 MG tablet Take 1 tablet (6.25 mg) by mouth 2 times daily (with meals) 180 tablet 3     warfarin (COUMADIN) 1 MG tablet Take 10 mg on Monday and Thursday and 7.5 mg all other days.  INR check on 12/9. 30 tablet      furosemide (LASIX) 20 MG tablet Take 20 mg by mouth daily        losartan (COZAAR) 25 MG tablet Take 1 tablet (25 mg) by mouth daily 90 tablet 3     metFORMIN (GLUCOPHAGE) 500 MG tablet Take 1 tablet (500 mg) by mouth daily (with breakfast) Take 2 tabs (1000 mg) with supper 60 tablet      order for DME DREAMSTATION     9-11 CM H20  FULL FACE AIRFIT F10 M       No facility-administered encounter medications on file as of 6/14/2017.       IMPRESSION:   1.  Atrial arrhythmias.  Michael has had a significant improvement in his atrial arrhythmias following catheter ablation, but unfortunately has residual arrhythmia.  Clinically, he is doing well at this time and his ejection fraction has normalized.  I had previously discussed with him the option of repeat catheter ablation in an attempt to eliminate his atrial arrhythmia.        Nonetheless, he believes that his arrhythmia burden is much less compared to 03/2017 when he had his Ziopatch.  He is very significantly overweight and his previous EP procedure, including the transseptal puncture, was tricky.  He is at increased risk for complication related to a repeat procedure and for the time being I would watch him closely.  I did not recommend changes in his medications.   2.  Sleep apnea.  He was congratulated on his compliance with CPAP.   3.  Severe obesity.  Michael told me that he has been unable to lose weight.  He asked me whether I would recommend bariatric surgery for him and I would be in favor of this.  There are multiple health issues that could be  potentially improved with significant weight loss such as his sleep apnea, diabetes and atrial fibrillation.  I encouraged him to discuss this with his primary care physician as well, but in my opinion, it is an option he should look into.      PLAN:   A.  Continue same medications.   B.  Follow-up appointment in 6 months.  I encouraged Michael to call me sooner if he has more frequent atrial arrhythmias in the next few months.   C.  Consider bariatric surgery for weight loss.                  I appreciate the opportunity to participate in his care.      Time spent was 25 minutes with greater than 50% of the time spent in discussion.     Sincerely,    Nathan Hooper MD     [facility]

## 2017-06-15 NOTE — PROGRESS NOTES
HISTORY OF PRESENT ILLNESS:    It was my pleasure seeing Mr. Michael Murphy in follow-up of atrial fibrillation, typical atrial flutter and tachycardia-induced cardiomyopathy.  Michael is a pleasant 52-year-old male with history of DVT/PE due to factor V Leiden mutation, severe obesity and sleep apnea treated with CPAP.      He underwent catheter ablation for atrial fibrillation and atrial flutter in 12/2016.  He has had residual arrhythmia and according to a ZioPatch last March he had both atrial fibrillation as well as atrial flutter (not clear if his flutter is typical or atypical).  The ventricular rate during atrial arrhythmias was not particularly fast, however.  He is currently on flecainide 100 mg b.i.d., carvedilol, warfarin, losartan.      In coming in today, Michael tells me that he feels even better now compared to March and he thinks that his arrhythmias have significantly settled down.  He believes he rarely has an episode of arrhythmia going over a few minutes.  His energy level has improved.  He remains compliant with his CPAP therapy.  Unfortunately, he has been unable to lose weight.      PHYSICAL EXAMINATION:   VITAL SIGNS:  Blood pressure 130/90, pulse 52 and regular, weight 155 kg, height 178 cm.      GENERAL:  Michael is very pleasant as always and remains severely overweight.   NECK:  Thick, supple, without carotid bruits.   LUNGS:  Clear.   CARDIOVASCULAR:  I cannot appreciate his jugular venous pressure.  The rhythm is regular.  There is no gallop or murmur.   ABDOMEN:  Severely obese.   EXTREMITIES:  No significant edema.      DIAGNOSTIC STUDIES:  His 12-lead ECG today showed sinus bradycardia.  Otherwise normal.      IMPRESSION:   1.  Atrial arrhythmias.  Michael has had a significant improvement in his atrial arrhythmias following catheter ablation, but unfortunately has residual arrhythmia.  Clinically, he is doing well at this time and his ejection fraction has normalized.  I had previously discussed  with him the option of repeat catheter ablation in an attempt to eliminate his atrial arrhythmia.        Nonetheless, he believes that his arrhythmia burden is much less compared to 2017 when he had his Ziopatch.  He is very significantly overweight and his previous EP procedure, including the transseptal puncture, was tricky.  He is at increased risk for complication related to a repeat procedure and for the time being I would watch him closely.  I did not recommend changes in his medications.   2.  Sleep apnea.  He was congratulated on his compliance with CPAP.   3.  Severe obesity.  Michael told me that he has been unable to lose weight.  He asked me whether I would recommend bariatric surgery for him and I would be in favor of this.  There are multiple health issues that could be potentially improved with significant weight loss such as his sleep apnea, diabetes and atrial fibrillation.  I encouraged him to discuss this with his primary care physician as well, but in my opinion, it is an option he should look into.      PLAN:   A.  Continue same medications.   B.  Follow-up appointment in 6 months.  I encouraged Michael to call me sooner if he has more frequent atrial arrhythmias in the next few months.   C.  Consider bariatric surgery for weight loss.      I appreciate the opportunity to participate in his care.      Time spent was 25 minutes with greater than 50% of the time spent in discussion.        JOE VALERO MD, [facility]         cc:   Pacheco Andino MD   [place] [id number]          D: 2017 16:47   T: 06/15/2017 11:58   MT: MARIE      Name:     MICHAEL WELLS   MRN:      [id number]        Account:      [id number]   :      1965           Service Date: 2017      Document: [id number]

## 2017-11-13 ENCOUNTER — TELEPHONE (OUTPATIENT)
Dept: CARDIOLOGY | Facility: CLINIC | Age: 52
End: 2017-11-13

## 2017-11-13 LAB
CHOLEST SERPL-MCNC: 171 MG/DL
ERYTHROCYTE [DISTWIDTH] IN BLOOD BY AUTOMATED COUNT: NORMAL %
HCT VFR BLD AUTO: 44.2 %
HDLC SERPL-MCNC: 36 MG/DL
HEMOGLOBIN: 14.6 G/DL (ref 13.3–17.7)
LDLC SERPL CALC-MCNC: 92 MG/DL
MCH RBC QN AUTO: 29 PG
MCHC RBC AUTO-ENTMCNC: 33 G/DL
MCV RBC AUTO: 88 FL
NONHDLC SERPL-MCNC: NORMAL MG/DL
PLATELET # BLD AUTO: 22 10^9/L
RBC # BLD AUTO: 5.01 10^12/L
TRIGL SERPL-MCNC: 215 MG/DL
TSH SERPL-ACNC: 3.99 MCU/ML
WBC # BLD AUTO: 8.7 10^9/L

## 2017-11-13 NOTE — TELEPHONE ENCOUNTER
Pt called and LM that his PMD would like to have him start Phentermine 37.5 mg daily for an appetite Supressant.  Wanting to make sure that this is ok to use.  Some of the possible side effects are rapid heartbeat/irregular heart beat and pt is aware of this. Will message Dr Hooper with his recommendations. JNelsonRABHINAV

## 2017-11-14 NOTE — TELEPHONE ENCOUNTER
Pt made aware of Dr Hooper recommendation that it is NOT advised to take the Phentermine. Pt states understanding. JNelsonRABHINAV.

## 2017-11-27 DIAGNOSIS — I48.3 TYPICAL ATRIAL FLUTTER (H): ICD-10-CM

## 2017-11-27 RX ORDER — LOSARTAN POTASSIUM 25 MG/1
25 TABLET ORAL DAILY
Qty: 90 TABLET | Refills: 2 | Status: SHIPPED | OUTPATIENT
Start: 2017-11-27 | End: 2018-08-29

## 2018-01-02 LAB — INR PPP: 1.9

## 2018-01-19 LAB — INR PPP: 2.2

## 2018-02-26 DIAGNOSIS — I48.19 PERSISTENT ATRIAL FIBRILLATION (H): ICD-10-CM

## 2018-02-26 RX ORDER — FLECAINIDE ACETATE 100 MG/1
100 TABLET ORAL 2 TIMES DAILY
Qty: 60 TABLET | Refills: 0 | Status: SHIPPED | OUTPATIENT
Start: 2018-02-26 | End: 2018-03-09

## 2018-02-26 NOTE — TELEPHONE ENCOUNTER
REFILL REQUEST    From: Shoaib Hernández [place]     Reviewed: Dr. Hooper's OV notes from 06-14-17      -------------------------------------  Medication: Flecainide  Dose: 100 mg  Instructions: Take 1 tab twice a day  Refill approved: Yes   Quantity given: 60  # Refills: 0  Patient is overdue for f/u.   -------------------------------------      Sapphire LAU  [facility]

## 2018-02-27 DIAGNOSIS — I48.3 TYPICAL ATRIAL FLUTTER (H): ICD-10-CM

## 2018-02-27 RX ORDER — CARVEDILOL 6.25 MG/1
6.25 TABLET ORAL 2 TIMES DAILY WITH MEALS
Qty: 180 TABLET | Refills: 1 | Status: SHIPPED | OUTPATIENT
Start: 2018-02-27 | End: 2018-08-29

## 2018-03-09 ENCOUNTER — OFFICE VISIT (OUTPATIENT)
Dept: CARDIOLOGY | Facility: CLINIC | Age: 53
End: 2018-03-09
Attending: INTERNAL MEDICINE
Payer: COMMERCIAL

## 2018-03-09 VITALS
HEART RATE: 47 BPM | DIASTOLIC BLOOD PRESSURE: 81 MMHG | BODY MASS INDEX: 44.81 KG/M2 | WEIGHT: 313 LBS | HEIGHT: 70 IN | SYSTOLIC BLOOD PRESSURE: 144 MMHG

## 2018-03-09 DIAGNOSIS — I48.3 TYPICAL ATRIAL FLUTTER (H): ICD-10-CM

## 2018-03-09 DIAGNOSIS — E66.01 MORBID OBESITY DUE TO EXCESS CALORIES (H): Primary | ICD-10-CM

## 2018-03-09 DIAGNOSIS — I42.9 CARDIOMYOPATHY, UNSPECIFIED TYPE (H): ICD-10-CM

## 2018-03-09 DIAGNOSIS — I48.19 PERSISTENT ATRIAL FIBRILLATION (H): ICD-10-CM

## 2018-03-09 PROCEDURE — 93000 ELECTROCARDIOGRAM COMPLETE: CPT | Performed by: INTERNAL MEDICINE

## 2018-03-09 PROCEDURE — 99213 OFFICE O/P EST LOW 20 MIN: CPT | Performed by: INTERNAL MEDICINE

## 2018-03-09 RX ORDER — FLECAINIDE ACETATE 100 MG/1
100 TABLET ORAL 2 TIMES DAILY
Qty: 180 TABLET | Refills: 3 | Status: SHIPPED | OUTPATIENT
Start: 2018-03-09 | End: 2019-02-25

## 2018-03-09 NOTE — MR AVS SNAPSHOT
After Visit Summary   3/9/2018    Michael Murphy    MRN: [id number]           Patient Information     Date Of Birth          1965        Visit Information        Provider Department      3/9/2018 3:45 PM Nathan Hooper MD [facility]        Today's Diagnoses     Morbid obesity due to excess calories (H)    -  1    Persistent atrial fibrillation (H)        Typical atrial flutter (H)        Cardiomyopathy, unspecified type (H)           Follow-ups after your visit        Additional Services     Follow-Up with Electrophysiologist                 Future tests that were ordered for you today     Open Future Orders        Priority Expected Expires Ordered    Zio Patch Monitor Routine 2/13/2019 3/9/2019 3/9/2018    EKG 12-lead complete w/read - Clinics (to be scheduled) Routine 3/9/2019 3/10/2019 3/9/2018    Follow-Up with Electrophysiologist Routine 3/9/2019 3/10/2019 3/9/2018            Who to contact     If you have questions or need follow up information about today's clinic visit or your schedule please contact [facility] directly at [phone].  Normal or non-critical lab and imaging results will be communicated to you by [employer], letter or phone within 4 business days after the clinic has received the results. If you do not hear from us within 7 days, please contact the clinic through [employer] or phone. If you have a critical or abnormal lab result, we will notify you by phone as soon as possible.  Submit refill requests through [employer] or call your pharmacy and they will forward the refill request to us. Please allow 3 business days for your refill to be completed.          Additional Information About Your Visit        MyChart Information     [employer] gives you secure access to your electronic health record. If you see a primary care provider, you can also send messages to your care team and make  "appointments. If you have questions, please call your primary care clinic.  If you do not have a primary care provider, please call [phone] and they will assist you.        Care EveryWhere ID     This is your Care EveryWhere ID. This could be used by other organizations to access your [place] medical records  [id number]        Your Vitals Were     Pulse Height BMI (Body Mass Index)             47 1.778 m (5' 10\") 44.91 kg/m2          Blood Pressure from Last 3 Encounters:   03/09/18 144/81   06/14/17 132/90   03/22/17 140/88    Weight from Last 3 Encounters:   03/09/18 (!) 142 kg (313 lb)   06/14/17 (!) 154.7 kg (341 lb)   03/22/17 (!) 159.8 kg (352 lb 4.8 oz)              We Performed the Following     EKG 12-lead complete w/read - Clinics (performed today)     Follow-Up with Electrophysiologist          Where to get your medicines      These medications were sent to [facility] [address] [place] [address] [id number] [facility] AT [address]  [address] [facility] [address]     Phone:  [phone]     flecainide 100 MG tablet          Primary Care Provider Office Phone # Fax #    Pacheco Andino [phone]       [facility] [address]  [facility] [address]        Equal Access to Services     BRAN CHAVIRA AH: Hadii aad ku hadasho Soomaali, waaxda luqadaha, qaybta kaalmada adeegyada, destinee rebolledo. So [facility] [phone].    ATENCIÓN: Si habla español, tiene a sandoval disposición servicios gratuitos de asistencia lingüística. Llcaitlin al [phone].    We comply with applicable federal civil rights laws and [place] laws. We do not discriminate on the basis of race, color, national origin, age, disability, sex, sexual orientation, or gender identity.            Thank you!     Thank you for choosing [facility]  for your care. Our goal is always to provide you with excellent care. Hearing back " from our patients is one way we can continue to improve our services. Please take a few minutes to complete the written survey that you may receive in the mail after your visit with us. Thank you!             Your Updated Medication List - Protect others around you: Learn how to safely use, store and throw away your medicines at www.disposemymeds.org.          This list is accurate as of 3/9/18  4:12 PM.  Always use your most recent med list.                   Brand Name Dispense Instructions for use Diagnosis    carvedilol 6.25 MG tablet    COREG    180 tablet    Take 1 tablet (6.25 mg) by mouth 2 times daily (with meals)    Typical atrial flutter (H)       flecainide 100 MG tablet    TAMBOCOR    180 tablet    Take 1 tablet (100 mg) by mouth 2 times daily    Persistent atrial fibrillation (H)       furosemide 20 MG tablet    LASIX     Take 20 mg by mouth daily    Typical atrial flutter (H)       losartan 25 MG tablet    COZAAR    90 tablet    Take 1 tablet (25 mg) by mouth daily    Typical atrial flutter (H)       metFORMIN 500 MG tablet    GLUCOPHAGE    60 tablet    Take 1 tablet (500 mg) by mouth daily (with breakfast) Take 2 tabs (1000 mg) with supper        order for DME      DREAMSTATION   9-11 CM H20 FULL FACE AIRFIT F10 M        simvastatin 40 MG tablet    ZOCOR     Take 40 mg by mouth At Bedtime        warfarin 1 MG tablet    COUMADIN    30 tablet    Take 10 mg on Monday and Thursday and 7.5 mg all other days.  INR check on 12/9.    Paroxysmal atrial fibrillation (H)

## 2018-03-09 NOTE — LETTER
3/9/2018    Pacheco Andino MD  [facility] [address]  [place] [address]    RE: Michael Murphy       Dear Colleague,    I had the pleasure of seeing Michael Murphy in the [facility].    HPI and Plan:   See dictation    Orders Placed This Encounter   Procedures     Follow-Up with Electrophysiologist     EKG 12-lead complete w/read - Clinics (performed today)     EKG 12-lead complete w/read - Clinics (to be scheduled)     Zio Patch Monitor       Orders Placed This Encounter   Medications     flecainide (TAMBOCOR) 100 MG tablet     Sig: Take 1 tablet (100 mg) by mouth 2 times daily     Dispense:  180 tablet     Refill:  3       Medications Discontinued During This Encounter   Medication Reason     flecainide (TAMBOCOR) 100 MG tablet Reorder         Encounter Diagnoses   Name Primary?     Persistent atrial fibrillation (H)      Morbid obesity due to excess calories (H) Yes     Typical atrial flutter (H)      Cardiomyopathy, unspecified type (H)        CURRENT MEDICATIONS:  Current Outpatient Prescriptions   Medication Sig Dispense Refill     flecainide (TAMBOCOR) 100 MG tablet Take 1 tablet (100 mg) by mouth 2 times daily 180 tablet 3     carvedilol (COREG) 6.25 MG tablet Take 1 tablet (6.25 mg) by mouth 2 times daily (with meals) 180 tablet 1     losartan (COZAAR) 25 MG tablet Take 1 tablet (25 mg) by mouth daily 90 tablet 2     simvastatin (ZOCOR) 40 MG tablet Take 40 mg by mouth At Bedtime       warfarin (COUMADIN) 1 MG tablet Take 10 mg on Monday and Thursday and 7.5 mg all other days.  INR check on 12/9. 30 tablet      furosemide (LASIX) 20 MG tablet Take 20 mg by mouth daily        metFORMIN (GLUCOPHAGE) 500 MG tablet Take 1 tablet (500 mg) by mouth daily (with breakfast) Take 2 tabs (1000 mg) with supper 60 tablet      [DISCONTINUED] flecainide (TAMBOCOR) 100 MG tablet Take 1 tablet (100 mg) by mouth 2 times daily 60 tablet 0     order for DME DREAMSTATION      9-11 CM H20  FULL FACE AIRFIT F10 M         ALLERGIES     Allergies   Allergen Reactions     Cephalosporins Rash       PAST MEDICAL HISTORY:  Past Medical History:   Diagnosis Date     Abnormal pulmonary function test-Hx of abnormal sleep test 8/8/2016     Atrial fibrillation (H) 12-5-2016    PVI ablation and RA isthmus ablation     Cardiomyopathy (H)      Decreased left ventricular function-EF 20-25% per echo 8-1-2016 8/8/2016     DVT (deep vein thrombosis) in pregnancy (H)      Factor V Leiden (H) 8/8/2016     Obesity      Paroxysmal atrial fibrillation (H) 2007     Pulmonary emboli (H) 2011     Sleep apnea        PAST SURGICAL HISTORY:  Past Surgical History:   Procedure Laterality Date     CARDIOVERSION  8/19/16    failed     CARDIOVERSION  9/27/16     H ABLATION FOCAL AFIB  12/5/16       FAMILY HISTORY:  Family History   Problem Relation Age of Onset     HEART DISEASE Mother      stent placed     HEART DISEASE Father      bypass x4     CANCER Father      lung       SOCIAL HISTORY:  Social History     Social History     Marital status:      Spouse name: N/A     Number of children: N/A     Years of education: N/A     Social History Main Topics     Smoking status: Never Smoker     Smokeless tobacco: Former User     Alcohol use Yes      Comment: occ     Drug use: None     Sexual activity: Not Asked     Other Topics Concern     Parent/Sibling W/ Cabg, Mi Or Angioplasty Before 65f 55m? No     Caffeine Concern No     occ. pop/tea daily     Sleep Concern No     Stress Concern Yes     Weight Concern Yes     trying to lose weight     Special Diet No     Exercise Yes     20-40 min, walking on treadmill daily     Seat Belt Yes     Social History Narrative       Review of Systems:  Skin:  Negative       Eyes:  Positive for glasses    ENT:  Negative      Respiratory:  Positive for sleep apnea;CPAP     Cardiovascular:    palpitations;Positive for;edema AFIB  Gastroenterology: Negative      Genitourinary:  Negative       Musculoskeletal:  Negative      Neurologic:  Negative      Psychiatric:  Positive for excessive stress;anxiety;depression always   Heme/Lymph/Imm:  Positive for easy bruising    Endocrine:  Positive for diabetes      [id number]          Thank you for allowing me to participate in the care of your patient.      Sincerely,     Nathan Hooper MD     [facility]    cc:   Nathan Hooper MD  [address] [facility] [address]  [place] [address]

## 2018-03-09 NOTE — PROGRESS NOTES
HPI and Plan:   See dictation    Orders Placed This Encounter   Procedures     Follow-Up with Electrophysiologist     EKG 12-lead complete w/read - Clinics (performed today)     EKG 12-lead complete w/read - Clinics (to be scheduled)     Zio Patch Monitor       Orders Placed This Encounter   Medications     flecainide (TAMBOCOR) 100 MG tablet     Sig: Take 1 tablet (100 mg) by mouth 2 times daily     Dispense:  180 tablet     Refill:  3       Medications Discontinued During This Encounter   Medication Reason     flecainide (TAMBOCOR) 100 MG tablet Reorder         Encounter Diagnoses   Name Primary?     Persistent atrial fibrillation (H)      Morbid obesity due to excess calories (H) Yes     Typical atrial flutter (H)      Cardiomyopathy, unspecified type (H)        CURRENT MEDICATIONS:  Current Outpatient Prescriptions   Medication Sig Dispense Refill     flecainide (TAMBOCOR) 100 MG tablet Take 1 tablet (100 mg) by mouth 2 times daily 180 tablet 3     carvedilol (COREG) 6.25 MG tablet Take 1 tablet (6.25 mg) by mouth 2 times daily (with meals) 180 tablet 1     losartan (COZAAR) 25 MG tablet Take 1 tablet (25 mg) by mouth daily 90 tablet 2     simvastatin (ZOCOR) 40 MG tablet Take 40 mg by mouth At Bedtime       warfarin (COUMADIN) 1 MG tablet Take 10 mg on Monday and Thursday and 7.5 mg all other days.  INR check on 12/9. 30 tablet      furosemide (LASIX) 20 MG tablet Take 20 mg by mouth daily        metFORMIN (GLUCOPHAGE) 500 MG tablet Take 1 tablet (500 mg) by mouth daily (with breakfast) Take 2 tabs (1000 mg) with supper 60 tablet      [DISCONTINUED] flecainide (TAMBOCOR) 100 MG tablet Take 1 tablet (100 mg) by mouth 2 times daily 60 tablet 0     order for DME DREAMSTATION     9-11 CM H20  FULL FACE AIRFIT F10 M         ALLERGIES     Allergies   Allergen Reactions     Cephalosporins Rash       PAST MEDICAL HISTORY:  Past Medical History:   Diagnosis Date     Abnormal pulmonary function test-Hx of abnormal sleep  test 8/8/2016     Atrial fibrillation (H) 12-5-2016    PVI ablation and RA isthmus ablation     Cardiomyopathy (H)      Decreased left ventricular function-EF 20-25% per echo 8-1-2016 8/8/2016     DVT (deep vein thrombosis) in pregnancy (H)      Factor V Leiden (H) 8/8/2016     Obesity      Paroxysmal atrial fibrillation (H) 2007     Pulmonary emboli (H) 2011     Sleep apnea        PAST SURGICAL HISTORY:  Past Surgical History:   Procedure Laterality Date     CARDIOVERSION  8/19/16    failed     CARDIOVERSION  9/27/16     H ABLATION FOCAL AFIB  12/5/16       FAMILY HISTORY:  Family History   Problem Relation Age of Onset     HEART DISEASE Mother      stent placed     HEART DISEASE Father      bypass x4     CANCER Father      lung       SOCIAL HISTORY:  Social History     Social History     Marital status:      Spouse name: N/A     Number of children: N/A     Years of education: N/A     Social History Main Topics     Smoking status: Never Smoker     Smokeless tobacco: Former User     Alcohol use Yes      Comment: occ     Drug use: None     Sexual activity: Not Asked     Other Topics Concern     Parent/Sibling W/ Cabg, Mi Or Angioplasty Before 65f 55m? No     Caffeine Concern No     occ. pop/tea daily     Sleep Concern No     Stress Concern Yes     Weight Concern Yes     trying to lose weight     Special Diet No     Exercise Yes     20-40 min, walking on treadmill daily     Seat Belt Yes     Social History Narrative       Review of Systems:  Skin:  Negative       Eyes:  Positive for glasses    ENT:  Negative      Respiratory:  Positive for sleep apnea;CPAP     Cardiovascular:    palpitations;Positive for;edema AFIB  Gastroenterology: Negative      Genitourinary:  Negative      Musculoskeletal:  Negative      Neurologic:  Negative      Psychiatric:  Positive for excessive stress;anxiety;depression always   Heme/Lymph/Imm:  Positive for easy bruising    Endocrine:  Positive for diabetes      [id number]

## 2018-03-09 NOTE — LETTER
3/9/2018      Pacheco Andino MD  [facility] [address]  [facility] [address]      RE: Michael Murphy       Dear Colleague,    I had the pleasure of seeing Michael Murphy in the [facility].    Service Date: 03/09/2018      HISTORY OF PRESENT ILLNESS:  I had the pleasure of seeing Michael Murphy, a very pleasant, 52-year-old male, in followup of persistent atrial fibrillation, typical atrial flutter and tachycardia-induced cardiomyopathy.  Michael has a history of DVT/PE due to factor V Leiden mutation, severe obesity and sleep apnea treated with a CPAP.  He had a catheter ablation for atrial fibrillation and atrial flutter in 12/2016.  Later on, we demonstrated recurrent arrhythmia (both atrial fibrillation and probably atrial flutter as well).  He has been maintained on flecainide, carvedilol, warfarin and losartan.      Since I last saw him 9 months ago, Michael told me that he has felt quite well.  He has been able to lose nearly 30 pounds with the isogenic diet.  He feels much better.  In terms of his arrhythmia, he tells me he has not been aware of any sustained palpitation over the past 9 months.  He has only occasional brief chest flutter, but when he checks his pulse, it is never fast.  He remains compliant with CPAP therapy.      PHYSICAL EXAMINATION:   VITAL SIGNS:  Blood pressure 144/81, pulse 47 and regular, weight 142 kg (down 13 kg since 06/2017), height 178 cm.   GENERAL:  He is very pleasant and severely overweight, though visibly less so than the last time I saw him.   NECK:  Thick and supple without bruits.   LUNGS:  Clear.   CARDIOVASCULAR:  Regular rhythm.  No gallop, murmur or rub.   EXTREMITIES:  No edema.      DIAGNOSTIC STUDIES:  His 12 lead ECG today showed sinus bradycardia at 48, otherwise normal.  QRS duration 111 msec.      IMPRESSION:   1.  Atrial arrhythmias and history of tachycardia-induced cardiomyopathy.  Symptomatically, Michael is doing very  well.  He remains on flecainide, which I encouraged him to continue.  He is on warfarin, and his INR has been monitored by his Primary Care provider.  I did not make any changes.  I will repeat a Ziopatch monitor before I see him again in 1 year.   2.  Sleep apnea.  He was congratulated on his compliance with CPAP.   3.  Severe obesity.  He has made great progress in losing weight in the past 6 months.  I congratulated him.  I encouraged him to keep this up and see if he can lose another 30 pounds over the next year.  He told me that the diet that he is following is very manageable, and he thinks he can stick with it.      PLAN:    1.  Continue the same cardiac medications.   2.  Follow up in 1 year with a 2 week Ziopatch to be hooked up 1 month before the appointment.      It was my pleasure seeing this very nice man.      cc:   [place] [address]         JOE VALERO MD             D: 2018   T: 2018   MT: santiago      Name:     TIFF WELLS   MRN:      [id number]        Account:      [id number]   :      1965           Service Date: 2018      Document: [id number]         Outpatient Encounter Prescriptions as of 3/9/2018   Medication Sig Dispense Refill     flecainide (TAMBOCOR) 100 MG tablet Take 1 tablet (100 mg) by mouth 2 times daily 180 tablet 3     carvedilol (COREG) 6.25 MG tablet Take 1 tablet (6.25 mg) by mouth 2 times daily (with meals) 180 tablet 1     losartan (COZAAR) 25 MG tablet Take 1 tablet (25 mg) by mouth daily 90 tablet 2     simvastatin (ZOCOR) 40 MG tablet Take 40 mg by mouth At Bedtime       warfarin (COUMADIN) 1 MG tablet Take 10 mg on Monday and Thursday and 7.5 mg all other days.  INR check on . 30 tablet      furosemide (LASIX) 20 MG tablet Take 20 mg by mouth daily        metFORMIN (GLUCOPHAGE) 500 MG tablet Take 1 tablet (500 mg) by mouth daily (with breakfast) Take 2 tabs  (1000 mg) with supper 60 tablet      [DISCONTINUED] flecainide (TAMBOCOR) 100 MG tablet Take 1 tablet (100 mg) by mouth 2 times daily 60 tablet 0     order for DME DREAMSTATION     9-11 CM H20  FULL FACE AIRFIT F10 M       No facility-administered encounter medications on file as of 3/9/2018.        Again, thank you for allowing me to participate in the care of your patient.      Sincerely,    Nathan Hooper MD     [facility]

## 2018-03-12 NOTE — PROGRESS NOTES
"Service Date: 03/09/2018      HISTORY OF PRESENT ILLNESS:    I had the pleasure of seeing Michael Murphy, a very pleasant 52-year-old male, in follow-up of persistent atrial fibrillation, typical atrial flutter and tachycardia-induced cardiomyopathy.  Michael has history of DVT/PE due to factor V Leiden mutation, severe obesity and sleep apnea treated with CPAP.  He underwent catheter ablation of atrial fibrillation and typical atrial flutter in 12/2016.  Later on, we demonstrated recurrent arrhythmia.  He has been maintained on flecainide, carvedilol, warfarin and losartan.      Since I last saw him 9 months ago, Michael has felt well.  He has lost nearly 30 pounds with the \"isogenic diet\".  He feels better.  In terms of arrhythmia, he has not been aware of sustained palpitation in months.  He only has occasional brief chest fluttering, but when he checks his pulse, it is never fast.  He remains compliant with CPAP therapy.      PHYSICAL EXAMINATION:   VITAL SIGNS:  Blood pressure 144/81, pulse 47 and regular, weight 142 kg (down by 13 kg since 06/2017), height 178 cm.   GENERAL:  He is very pleasant and severely overweight, though visibly less so compared to the last time I saw him.   NECK:  Thick and supple without bruits.   LUNGS:  Clear.   CARDIOVASCULAR:  Regular rhythm, somewhat bradycardic.  No gallop, murmur or rub.   EXTREMITIES:  No edema.      DIAGNOSTIC STUDIES:    His 12 lead ECG today showed sinus bradycardia at 48, otherwise normal.  QRS duration 111 msec.      IMPRESSION:   1.  Atrial arrhythmias and history of tachycardia-induced cardiomyopathy.  Symptomatically, Michale is doing well.  He remains on flecainide, which I encouraged him to continue.  He is on warfarin and his INR is being monitored by his Primary Care provider.  I did not make changes to his cardiac medications.  Repeat a Ziopatch monitor before I see him again in 1 year.   2.  Sleep apnea.  He was congratulated on his compliance with CPAP.   3. "  Severe obesity.  He has made great progress in losing weight in the past 6 months.  I congratulated him and encouraged him to keep this up and try lose another 20-30 pounds over the next year.  He said that the diet he is following is manageable and he believes he will stick with it.      PLAN:    1.  Continue same cardiac medications.   2.  Follow up in 1 year with a 2-week Ziopatch to be hooked up 1 month before the appointment.      It was my pleasure seeing this very nice man.        JOE VALERO MD, [facility]          cc:   [place] [address]          D: 2018   T: 2018   MT: santiago      Name:     TIFF WELLS   MRN:      [id number]        Account:      [id number]   :      1965           Service Date: 2018      Document: [id number]

## 2018-08-29 DIAGNOSIS — I48.3 TYPICAL ATRIAL FLUTTER (H): ICD-10-CM

## 2018-08-29 RX ORDER — CARVEDILOL 6.25 MG/1
6.25 TABLET ORAL 2 TIMES DAILY WITH MEALS
Qty: 180 TABLET | Refills: 1 | Status: SHIPPED | OUTPATIENT
Start: 2018-08-29 | End: 2019-02-25

## 2018-08-29 RX ORDER — LOSARTAN POTASSIUM 25 MG/1
25 TABLET ORAL DAILY
Qty: 90 TABLET | Refills: 2 | Status: SHIPPED | OUTPATIENT
Start: 2018-08-29 | End: 2019-05-14 | Stop reason: ALTCHOICE

## 2019-02-13 ENCOUNTER — HOSPITAL ENCOUNTER (OUTPATIENT)
Dept: CARDIOLOGY | Facility: CLINIC | Age: 54
Discharge: HOME | End: 2019-02-13
Attending: INTERNAL MEDICINE | Admitting: INTERNAL MEDICINE
Payer: COMMERCIAL

## 2019-02-13 DIAGNOSIS — I48.19 PERSISTENT ATRIAL FIBRILLATION (H): ICD-10-CM

## 2019-02-13 PROCEDURE — 0296T ZIO PATCH HOLTER ADULT PEDIATRIC GREATER THAN 48 HRS: CPT

## 2019-02-13 PROCEDURE — 0298T ZIO PATCH HOLTER ADULT PEDIATRIC GREATER THAN 48 HRS: CPT | Performed by: INTERNAL MEDICINE

## 2019-02-25 DIAGNOSIS — I48.19 PERSISTENT ATRIAL FIBRILLATION (H): ICD-10-CM

## 2019-02-25 DIAGNOSIS — I48.3 TYPICAL ATRIAL FLUTTER (H): ICD-10-CM

## 2019-02-25 RX ORDER — CARVEDILOL 6.25 MG/1
6.25 TABLET ORAL 2 TIMES DAILY WITH MEALS
Qty: 180 TABLET | Refills: 0 | Status: SHIPPED | OUTPATIENT
Start: 2019-02-25 | End: 2019-05-10

## 2019-02-25 RX ORDER — FLECAINIDE ACETATE 100 MG/1
100 TABLET ORAL 2 TIMES DAILY
Qty: 180 TABLET | Refills: 0 | Status: SHIPPED | OUTPATIENT
Start: 2019-02-25 | End: 2019-05-10

## 2019-03-07 ENCOUNTER — OFFICE VISIT (OUTPATIENT)
Dept: CARDIOLOGY | Facility: CLINIC | Age: 54
End: 2019-03-07
Payer: COMMERCIAL

## 2019-03-07 VITALS
HEIGHT: 70 IN | DIASTOLIC BLOOD PRESSURE: 76 MMHG | SYSTOLIC BLOOD PRESSURE: 134 MMHG | WEIGHT: 315 LBS | BODY MASS INDEX: 45.1 KG/M2 | HEART RATE: 57 BPM

## 2019-03-07 DIAGNOSIS — G47.33 OSA (OBSTRUCTIVE SLEEP APNEA): ICD-10-CM

## 2019-03-07 DIAGNOSIS — I48.19 PERSISTENT ATRIAL FIBRILLATION (H): ICD-10-CM

## 2019-03-07 DIAGNOSIS — I42.9 CARDIOMYOPATHY, UNSPECIFIED TYPE (H): Primary | ICD-10-CM

## 2019-03-07 DIAGNOSIS — E66.01 MORBID OBESITY DUE TO EXCESS CALORIES (H): ICD-10-CM

## 2019-03-07 PROCEDURE — 99214 OFFICE O/P EST MOD 30 MIN: CPT | Mod: 25 | Performed by: INTERNAL MEDICINE

## 2019-03-07 PROCEDURE — 93000 ELECTROCARDIOGRAM COMPLETE: CPT | Performed by: INTERNAL MEDICINE

## 2019-03-07 ASSESSMENT — MIFFLIN-ST. JEOR: SCORE: 2375.35

## 2019-03-07 NOTE — LETTER
"3/7/2019      Pacheco Andino MD  [facility] [address] [facility] [address]      RE: Michael Murphy       Dear Colleague,    I had the pleasure of seeing Michael Murphy in the [facility].    Service Date: 03/07/2019      HISTORY OF PRESENT ILLNESS:  I had the pleasure of seeing Mr. Michael Murphy, a very pleasant 53-year-old male with the following cardiac/medical issues:   A.  History of persistent atrial fibrillation and typical atrial flutter with tachycardia-induced cardiomyopathy.  Catheter ablation of atrial fibrillation and typical atrial flutter in 12/2016.  Documentation of paroxysmal AF after the procedure.  Maintained on flecainide, carvedilol and warfarin.   B.  History of tachycardia-induced cardiomyopathy with EF of 20%-30%.  EF normalization with restoration of normal sinus rhythm.   C.  Severe obesity.   D.  History of DVT/PE due to factor V Leiden mutation.  On warfarin.   E.  Sleep apnea treated with CPAP.   F.  Dyslipidemia.      Michael has been feeling well.  Over the past year, he tells me he has had very little arrhythmia that he knows of.  In fact, he is only aware of 1 or 2 brief episodes of atrial fibrillation per month, typically lasting no more than 5 minutes.  The longest episode over the past year may have lasted 15 minutes.  He overall feels quite well.      When I saw him last year, he had lost about 28 pounds with the use of the so-called \"isogenic diet\" but coming in today he has gained most of his weight back.  He tells me that he follows the diet but he travels a lot and it is difficult to maintain the diet as well as a regular exercise schedule.      He uses his CPAP faithfully.  He does not have chest pain with exertion.  He does not have syncope.  He does have chronic lower extremity edema, especially on the right related to his previous DVT and venous insufficiency.      PHYSICAL EXAMINATION:   VITAL SIGNS:  Blood pressure 134/76, " pulse 57 and regular, weight 152 kilos (334 pounds).   GENERAL:  He is a pleasant gentleman who is severely overweight, in no apparent distress.     HEENT:  Head normocephalic.   NECK:  Very thick, supple, without carotid bruits.   LUNGS:  Clear.  No crackles or wheezes.   CARDIOVASCULAR:  Normal JVP, regular rhythm, without gallop, murmur, or rub.  Heart sounds are distant.   ABDOMEN:  Very obese, soft, nontender.   EXTREMITIES:  1+ pitting edema, slightly more prominent on the left with skin changes consistent with chronic venous stasis.   NEUROLOGIC:  Alert and oriented x3.      DIAGNOSTIC STUDIES:     His 12-lead ECG today showed sinus rhythm at 56.  Normal tracing.      The patient wore a Myero patch monitor in 02/2019.  This was ordered for 14 days but only kept this on for slightly over 3 days.  He told me that the patch kept coming off and it would not stick on his skin.  He did not have atrial fibrillation.  He did have rare PACs and PVCs.        IMPRESSION:    1.  History of persistent atrial arrhythmias and tachycardia-induced cardiomyopathy.  Michael is doing very well.  He has minimal symptomatic arrhythmia at this time.  He is on flecainide 100 mg b.i.d., which I encouraged him to continue.  His EF was normal in 2017.  I will repeat an echocardiogram next year.   2.  History of DVT/PE.  He will remain on warfarin.   3.  Severe obesity.  He needs to lose weight, and this was emphasized once again.  He vowed to start a regular exercise program.   4.  Obstructive sleep apnea.  He was congratulated upon his compliance with CPAP.      RECOMMENDATIONS:   A.  Continue current cardiac medications.   B.  He expressed some concerns about a recent recall on losartan.  I do not have a strong indication for discontinuing the medication at this time.   C.  Follow up in 1 year with echocardiogram before the visit.  An ECG is needed on the day of his visit.  I encouraged him to call sooner if he has issues in the interim.       It was my pleasure seeing Michael in the clinic today.      cc:   Pacheco Andino MD    [address]  [address]         JOE VALERO MD             D: 2019   T: 2019   MT: SIM      Name:     MICHAEL WELLS   MRN:      [id number]        Account:      [id number]   :      1965           Service Date: 2019      Document: [id number]         Outpatient Encounter Medications as of 3/7/2019   Medication Sig Dispense Refill     carvedilol (COREG) 6.25 MG tablet Take 1 tablet (6.25 mg) by mouth 2 times daily (with meals) 180 tablet 0     flecainide (TAMBOCOR) 100 MG tablet Take 1 tablet (100 mg) by mouth 2 times daily 180 tablet 0     furosemide (LASIX) 20 MG tablet Take 20 mg by mouth daily        losartan (COZAAR) 25 MG tablet Take 1 tablet (25 mg) by mouth daily 90 tablet 2     metFORMIN (GLUCOPHAGE) 500 MG tablet Take 1 tablet (500 mg) by mouth daily (with breakfast) Take 2 tabs (1000 mg) with supper 60 tablet      rivaroxaban ANTICOAGULANT (XARELTO) 20 MG TABS tablet Take 20 mg by mouth daily       simvastatin (ZOCOR) 40 MG tablet Take 40 mg by mouth At Bedtime       order for DME DREAMSTATION     9-11 CM H20  FULL FACE AIRFIT F10 M       [DISCONTINUED] warfarin (COUMADIN) 1 MG tablet Take 10 mg on Monday and Thursday and 7.5 mg all other days.  INR check on . 30 tablet      No facility-administered encounter medications on file as of 3/7/2019.        Again, thank you for allowing me to participate in the care of your patient.      Sincerely,    Joe Valero MD     [facility]

## 2019-03-07 NOTE — LETTER
3/7/2019    Pacheco Andino MD  [facility] [address]  [facility] [address]    RE: Michael Murphy       Dear Colleague,    I had the pleasure of seeing Michael Murphy in the [facility].    HPI and Plan:   See dictation    Orders Placed This Encounter   Procedures     Follow-Up with Electrophysiologist     EKG 12-lead complete w/read - Clinics (performed today)     EKG 12-lead complete w/read - Clinics (to be scheduled)     Echocardiogram Complete       Orders Placed This Encounter   Medications     rivaroxaban ANTICOAGULANT (XARELTO) 20 MG TABS tablet     Sig: Take 20 mg by mouth daily       Medications Discontinued During This Encounter   Medication Reason     warfarin (COUMADIN) 1 MG tablet Discontinued by another Health Care Provider         Encounter Diagnosis   Name Primary?     Persistent atrial fibrillation (H)        CURRENT MEDICATIONS:  Current Outpatient Medications   Medication Sig Dispense Refill     carvedilol (COREG) 6.25 MG tablet Take 1 tablet (6.25 mg) by mouth 2 times daily (with meals) 180 tablet 0     flecainide (TAMBOCOR) 100 MG tablet Take 1 tablet (100 mg) by mouth 2 times daily 180 tablet 0     furosemide (LASIX) 20 MG tablet Take 20 mg by mouth daily        losartan (COZAAR) 25 MG tablet Take 1 tablet (25 mg) by mouth daily 90 tablet 2     metFORMIN (GLUCOPHAGE) 500 MG tablet Take 1 tablet (500 mg) by mouth daily (with breakfast) Take 2 tabs (1000 mg) with supper 60 tablet      rivaroxaban ANTICOAGULANT (XARELTO) 20 MG TABS tablet Take 20 mg by mouth daily       simvastatin (ZOCOR) 40 MG tablet Take 40 mg by mouth At Bedtime       order for DME DREAMSTATION     9-11 CM H20  FULL FACE AIRFIT F10 M         ALLERGIES     Allergies   Allergen Reactions     Cephalosporins Rash       PAST MEDICAL HISTORY:  Past Medical History:   Diagnosis Date     Abnormal pulmonary function test-Hx of abnormal sleep test 8/8/2016     Atrial fibrillation (H)  12-5-2016    PVI ablation and RA isthmus ablation     Cardiomyopathy (H)      Decreased left ventricular function-EF 20-25% per echo 8-1-2016 8/8/2016     DVT (deep vein thrombosis) in pregnancy (H)      Factor V Leiden (H) 8/8/2016     Obesity      Paroxysmal atrial fibrillation (H) 2007     Pulmonary emboli (H) 2011     Sleep apnea        PAST SURGICAL HISTORY:  Past Surgical History:   Procedure Laterality Date     CARDIOVERSION  8/19/16    failed     CARDIOVERSION  9/27/16     EP ABLATION ATRIAL FLUTTER N/A 12/05/2016    typical atrial flutter with bidirectional block across the CT isthmus.     H ABLATION FOCAL AFIB  12/5/16       FAMILY HISTORY:  Family History   Problem Relation Age of Onset     Heart Disease Mother         stent placed     Heart Disease Father         bypass x4     Cancer Father         lung       SOCIAL HISTORY:  Social History     Socioeconomic History     Marital status:      Spouse name: Not on file     Number of children: Not on file     Years of education: Not on file     Highest education level: Not on file   Occupational History     Not on file   Social Needs     Financial resource strain: Not on file     Food insecurity:     Worry: Not on file     Inability: Not on file     Transportation needs:     Medical: Not on file     Non-medical: Not on file   Tobacco Use     Smoking status: Never Smoker     Smokeless tobacco: Former User   Substance and Sexual Activity     Alcohol use: Yes     Comment: occ     Drug use: Not on file     Sexual activity: Not on file   Lifestyle     Physical activity:     Days per week: Not on file     Minutes per session: Not on file     Stress: Not on file   Relationships     Social connections:     Talks on phone: Not on file     Gets together: Not on file     Attends Jainism service: Not on file     Active member of club or organization: Not on file     Attends meetings of clubs or organizations: Not on file     Relationship status: Not on file      Intimate partner violence:     Fear of current or ex partner: Not on file     Emotionally abused: Not on file     Physically abused: Not on file     Forced sexual activity: Not on file   Other Topics Concern     Parent/sibling w/ CABG, MI or angioplasty before 65F 55M? No      Service Not Asked     Blood Transfusions Not Asked     Caffeine Concern No     Comment: occ. pop/tea daily     Occupational Exposure Not Asked     Hobby Hazards Not Asked     Sleep Concern No     Stress Concern Yes     Weight Concern Yes     Comment: trying to lose weight     Special Diet No     Back Care Not Asked     Exercise Yes     Comment: 20-40 min, walking on treadmill daily     Bike Helmet Not Asked     Seat Belt Yes     Self-Exams Not Asked   Social History Narrative     Not on file       Review of Systems:  Skin:  Negative       Eyes:  Positive for glasses    ENT:  Negative      Respiratory:  Positive for sleep apnea;CPAP     Cardiovascular:    Positive for;edema    Gastroenterology: Negative      Genitourinary:  Negative      Musculoskeletal:  Negative      Neurologic:  Negative      Psychiatric:  Negative      Heme/Lymph/Imm:  Negative      Endocrine:  Negative        [id number]            Thank you for allowing me to participate in the care of your patient.      Sincerely,     Nathan Hooper MD     [facility]    cc:   Nathan Hooper MD  [address]  [place] [address]

## 2019-03-07 NOTE — PROGRESS NOTES
"Service Date: 03/07/2019      HISTORY OF PRESENT ILLNESS:    I had the pleasure of seeing Mr. Michael Murphy, a pleasant 53-year-old male with the following cardiac/medical issues:   A.  History of persistent atrial fibrillation and typical atrial flutter with tachycardia-induced cardiomyopathy.  Catheter ablation of atrial fibrillation and typical atrial flutter in 12/2016.  Documentation of paroxysmal AF after the procedure.  Maintained on flecainide, carvedilol and warfarin.   B.  History of tachycardia-induced cardiomyopathy with EF of 20%-30%.  EF normalized after restoration of normal sinus rhythm.   C.  Severe obesity.   D.  History of DVT/PE due to factor V Leiden mutation.  On warfarin.   E.  Sleep apnea treated with CPAP.   F.  Dyslipidemia.      Michael has been feeling well.  Over the past year, he has had little arrhythmia that he knows of.  In fact, he is only aware of 1 or 2 brief episodes of atrial fibrillation per month, typically lasting no more than 5 minutes.  The longest episode over the past year lasted less than 15-20 minutes.       When I last saw him in 2018, he had lost about 28 pounds with the so-called \"isogenic diet\" but today I see that he has gained most of the weight back.  He still follows the diet but travels a lot and it is difficult to maintain the diet as well as a regular exercise schedule.      He uses his CPAP faithfully.  He does not have chest pain with exertion.  He does not have syncope.  He does have chronic lower extremity edema, especially on the right related to his previous DVT and venous insufficiency.      PHYSICAL EXAMINATION:   VITAL SIGNS:  Blood pressure 134/76, pulse 57 and regular, weight 152 kilos (334 pounds).   GENERAL:  He is a pleasant gentleman who is severely overweight, in no apparent distress.     HEENT:  Head normocephalic.   NECK:  Very thick, supple, without carotid bruits.   LUNGS:  Clear.  No crackles or wheezes.   CARDIOVASCULAR:  Normal JVP, regular " rhythm, without gallop, murmur, or rub.  Heart sounds are distant.   ABDOMEN:  Very obese, soft, nontender.   EXTREMITIES:  1+ pitting edema, slightly more prominent on the left with skin changes consistent with chronic venous stasis.   NEUROLOGIC:  Alert and oriented x3.      DIAGNOSTIC STUDIES:     His 12-lead ECG today showed sinus rhythm at 56.  Normal tracing.      The patient wore a Zio patch monitor in 02/2019.  This was ordered for 14 days but he only kept it on for 3 days.  He told me that the patch kept coming off and would not stick.  He did not have atrial fibrillation.  He had rare PACs and PVCs.          IMPRESSION:    1.  History of persistent atrial arrhythmias and tachycardia-induced cardiomyopathy.  Michael is doing well.  He has minimal symptomatic arrhythmia.  He is on flecainide 100 mg b.i.d. which I encouraged him to continue.  His EF was normal in 2017.  I will repeat an echocardiogram next year.   2.  History of DVT/PE.  He will remain on warfarin.   3.  Severe obesity.  He needs to lose weight and this was emphasized once again.  He vowed to start a regular exercise program.   4.  Obstructive sleep apnea.  He was congratulated upon his compliance with CPAP.      RECOMMENDATIONS:   A.  Continue current cardiac medications.   B.  He expressed concerns about the recent losartan recall.  I do not have a strong reason for discontinuing the medication at this time.   C.  Follow up in 1 year with echocardiogram before the visit.  An ECG is needed on the day of his visit.  I encouraged him to call sooner if he has issues in the interim.      It was my pleasure seeing Michael in the clinic today.   Time spent was 25 minutes.  More than 50% of time was discussion and counseling.         JOE VALERO MD, [facility]          cc:   [place] [address]          D: 03/07/2019   T: 03/07/2019   MT: SIM      Name:     MICHAEL WELLS   MRN:       [id number]        Account:      [id number]   :      1965           Service Date: 2019      Document: [id number]

## 2019-03-07 NOTE — PROGRESS NOTES
HPI and Plan:   See dictation    Orders Placed This Encounter   Procedures     Follow-Up with Electrophysiologist     EKG 12-lead complete w/read - Clinics (performed today)     EKG 12-lead complete w/read - Clinics (to be scheduled)     Echocardiogram Complete       Orders Placed This Encounter   Medications     rivaroxaban ANTICOAGULANT (XARELTO) 20 MG TABS tablet     Sig: Take 20 mg by mouth daily       Medications Discontinued During This Encounter   Medication Reason     warfarin (COUMADIN) 1 MG tablet Discontinued by another Health Care Provider         Encounter Diagnosis   Name Primary?     Persistent atrial fibrillation (H)        CURRENT MEDICATIONS:  Current Outpatient Medications   Medication Sig Dispense Refill     carvedilol (COREG) 6.25 MG tablet Take 1 tablet (6.25 mg) by mouth 2 times daily (with meals) 180 tablet 0     flecainide (TAMBOCOR) 100 MG tablet Take 1 tablet (100 mg) by mouth 2 times daily 180 tablet 0     furosemide (LASIX) 20 MG tablet Take 20 mg by mouth daily        losartan (COZAAR) 25 MG tablet Take 1 tablet (25 mg) by mouth daily 90 tablet 2     metFORMIN (GLUCOPHAGE) 500 MG tablet Take 1 tablet (500 mg) by mouth daily (with breakfast) Take 2 tabs (1000 mg) with supper 60 tablet      rivaroxaban ANTICOAGULANT (XARELTO) 20 MG TABS tablet Take 20 mg by mouth daily       simvastatin (ZOCOR) 40 MG tablet Take 40 mg by mouth At Bedtime       order for DME DREAMSTATION     9-11 CM H20  FULL FACE AIRFIT F10 M         ALLERGIES     Allergies   Allergen Reactions     Cephalosporins Rash       PAST MEDICAL HISTORY:  Past Medical History:   Diagnosis Date     Abnormal pulmonary function test-Hx of abnormal sleep test 8/8/2016     Atrial fibrillation (H) 12-5-2016    PVI ablation and RA isthmus ablation     Cardiomyopathy (H)      Decreased left ventricular function-EF 20-25% per echo 8-1-2016 8/8/2016     DVT (deep vein thrombosis) in pregnancy (H)      Factor V Leiden (H) 8/8/2016      Obesity      Paroxysmal atrial fibrillation (H) 2007     Pulmonary emboli (H) 2011     Sleep apnea        PAST SURGICAL HISTORY:  Past Surgical History:   Procedure Laterality Date     CARDIOVERSION  8/19/16    failed     CARDIOVERSION  9/27/16     EP ABLATION ATRIAL FLUTTER N/A 12/05/2016    typical atrial flutter with bidirectional block across the CT isthmus.     H ABLATION FOCAL AFIB  12/5/16       FAMILY HISTORY:  Family History   Problem Relation Age of Onset     Heart Disease Mother         stent placed     Heart Disease Father         bypass x4     Cancer Father         lung       SOCIAL HISTORY:  Social History     Socioeconomic History     Marital status:      Spouse name: Not on file     Number of children: Not on file     Years of education: Not on file     Highest education level: Not on file   Occupational History     Not on file   Social Needs     Financial resource strain: Not on file     Food insecurity:     Worry: Not on file     Inability: Not on file     Transportation needs:     Medical: Not on file     Non-medical: Not on file   Tobacco Use     Smoking status: Never Smoker     Smokeless tobacco: Former User   Substance and Sexual Activity     Alcohol use: Yes     Comment: occ     Drug use: Not on file     Sexual activity: Not on file   Lifestyle     Physical activity:     Days per week: Not on file     Minutes per session: Not on file     Stress: Not on file   Relationships     Social connections:     Talks on phone: Not on file     Gets together: Not on file     Attends Sabianist service: Not on file     Active member of club or organization: Not on file     Attends meetings of clubs or organizations: Not on file     Relationship status: Not on file     Intimate partner violence:     Fear of current or ex partner: Not on file     Emotionally abused: Not on file     Physically abused: Not on file     Forced sexual activity: Not on file   Other Topics Concern     Parent/sibling w/ CABG,  MI or angioplasty before 65F 55M? No      Service Not Asked     Blood Transfusions Not Asked     Caffeine Concern No     Comment: occ. pop/tea daily     Occupational Exposure Not Asked     Hobby Hazards Not Asked     Sleep Concern No     Stress Concern Yes     Weight Concern Yes     Comment: trying to lose weight     Special Diet No     Back Care Not Asked     Exercise Yes     Comment: 20-40 min, walking on treadmill daily     Bike Helmet Not Asked     Seat Belt Yes     Self-Exams Not Asked   Social History Narrative     Not on file       Review of Systems:  Skin:  Negative       Eyes:  Positive for glasses    ENT:  Negative      Respiratory:  Positive for sleep apnea;CPAP     Cardiovascular:    Positive for;edema    Gastroenterology: Negative      Genitourinary:  Negative      Musculoskeletal:  Negative      Neurologic:  Negative      Psychiatric:  Negative      Heme/Lymph/Imm:  Negative      Endocrine:  Negative        [id number]

## 2019-05-13 ENCOUNTER — DOCUMENTATION ONLY (OUTPATIENT)
Dept: CARDIOLOGY | Facility: CLINIC | Age: 54
End: 2019-05-13

## 2019-05-13 NOTE — PROGRESS NOTES
"Pt sent me a [facility] message: he was informed that his losartan has been \"recalled\".   Please prescribe lisinopril 10 mg instead.  As far as I can see he is not ACE-intolerant.  Can you confirm with him?    VARSHA Carl  "

## 2019-05-13 NOTE — TELEPHONE ENCOUNTER
Spoke with patient regarding losartan recall. He denies previous hx of ACE intolerance. Sent lisinopril prescription to [facility] in [place]. Patient verbalized understanding.   DEVANTE, RN

## 2019-05-14 ENCOUNTER — TELEPHONE (OUTPATIENT)
Dept: CARDIOLOGY | Facility: CLINIC | Age: 54
End: 2019-05-14

## 2019-05-14 DIAGNOSIS — I48.3 TYPICAL ATRIAL FLUTTER (H): ICD-10-CM

## 2019-05-14 DIAGNOSIS — I10 HYPERTENSION: Primary | ICD-10-CM

## 2019-05-14 RX ORDER — LISINOPRIL 10 MG/1
10 TABLET ORAL DAILY
Qty: 90 TABLET | Refills: 3 | Status: SHIPPED | OUTPATIENT
Start: 2019-05-14 | End: 2019-07-12

## 2019-05-14 NOTE — TELEPHONE ENCOUNTER
Spoke to pt who states that PCP Dr Andino changed pt from Warfarin to Xarelto a few months ago.  This is not documented in Care Everywhere.  Explained that a PA was sent to Dr Hooper, but this will need to be completed by PCP who ordered the Xarelto. Pt states understanding and will have wife call PCP.  Nino

## 2019-07-12 DIAGNOSIS — I10 HYPERTENSION: ICD-10-CM

## 2019-07-12 RX ORDER — LISINOPRIL 10 MG/1
10 TABLET ORAL DAILY
Qty: 90 TABLET | Refills: 3 | Status: SHIPPED | OUTPATIENT
Start: 2019-07-12 | End: 2020-07-20

## 2019-11-27 ENCOUNTER — TRANSFERRED RECORDS (OUTPATIENT)
Dept: HEALTH INFORMATION MANAGEMENT | Facility: CLINIC | Age: 54
End: 2019-11-27

## 2019-11-29 ENCOUNTER — TRANSFERRED RECORDS (OUTPATIENT)
Dept: HEALTH INFORMATION MANAGEMENT | Facility: CLINIC | Age: 54
End: 2019-11-29

## 2019-12-04 NOTE — TELEPHONE ENCOUNTER
FUTURE VISIT INFORMATION      FUTURE VISIT INFORMATION:    Date: 12/9/19    Time: 7:30am    Location: [facility]  REFERRAL INFORMATION:    Referring provider:  Rachel Evans    Referring providers clinic:  [facility]    Reason for visit/diagnosis  2.5cm lesion beneath right lobe w/ suspected venous malformation    RECORDS REQUESTED FROM:       Clinic name Comments Records Status Imaging Status   [facility]  ED visit 11/23/19 Epic/ Care Everywhere    [facility] OV/referral 11/29/19- [facility]  Notes/ove 11/25/19-12/2/19 Epic/Care Everywhere    [facility] MR Orbits done 11/29/19, CT Orbits done one 11/29/19- requested imaging- received and saved [facility] PAC   [facility] MR Head done one 11/23/19- requested imaging- received and saved  PAC

## 2019-12-09 ENCOUNTER — PRE VISIT (OUTPATIENT)
Dept: OPHTHALMOLOGY | Facility: CLINIC | Age: 54
End: 2019-12-09

## 2019-12-09 ENCOUNTER — OFFICE VISIT (OUTPATIENT)
Dept: OPHTHALMOLOGY | Facility: CLINIC | Age: 54
End: 2019-12-09
Payer: COMMERCIAL

## 2019-12-09 DIAGNOSIS — H05.89 ORBITAL MASS: Primary | ICD-10-CM

## 2019-12-09 PROBLEM — D12.6 ADENOMATOUS COLON POLYP: Status: ACTIVE | Noted: 2019-06-11

## 2019-12-09 RX ORDER — DORZOLAMIDE HYDROCHLORIDE AND TIMOLOL MALEATE 20; 5 MG/ML; MG/ML
SOLUTION/ DROPS OPHTHALMIC
Refills: 5 | COMMUNITY
Start: 2019-11-27 | End: 2021-11-23

## 2019-12-09 ASSESSMENT — VISUAL ACUITY
OD_PH_CC: 20/70
OS_CC+: -1
METHOD: SNELLEN - LINEAR
OS_CC: 20/20
OD_PH_CC+: -2
CORRECTION_TYPE: GLASSES
OD_CC: 20/80
OD_CC+: -1

## 2019-12-09 ASSESSMENT — LAGOPHTHALMOS
OD_LAGOPHTHALMOS: 0
OS_LAGOPHTHALMOS: 0

## 2019-12-09 ASSESSMENT — SLIT LAMP EXAM - LIDS
COMMENTS: NORMAL
COMMENTS: NORMAL

## 2019-12-09 ASSESSMENT — MARGIN REFLEX DISTANCE
OD_MRD1: 3
OS_MRD1: 3

## 2019-12-09 ASSESSMENT — CONF VISUAL FIELD
METHOD: COUNTING FINGERS
OS_NORMAL: 1
OD_NORMAL: 1

## 2019-12-09 ASSESSMENT — EXTERNAL EXAM - RIGHT EYE: OD_EXAM: NORMAL

## 2019-12-09 ASSESSMENT — TONOMETRY
OS_IOP_MMHG: 17
IOP_METHOD: ICARE
OD_IOP_MMHG: 16

## 2019-12-09 ASSESSMENT — LEVATOR FUNCTION
OD_LEVATOR: 15
OS_LEVATOR: 15

## 2019-12-09 ASSESSMENT — CUP TO DISC RATIO
OS_RATIO: 0.3
OD_RATIO: 0.3

## 2019-12-09 ASSESSMENT — EXTERNAL EXAM - LEFT EYE: OS_EXAM: NORMAL

## 2019-12-09 NOTE — LETTER
2019         RE:  :  MRN: Michael Murphy  1965  [id number]     Dear Dr. Andino,    Thank you for asking me to see your patient, Michael Murphy, for an oculoplastic   consultation.  My assessment and plan are below.  For further details, please see my attached clinic note.      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 No significant increase in right proptosis with bearing down  -History of amblyopia right eye since childhood. Patient reports no change in vision right eye but will try to track down vision from visit 1yr ago  -No concern for optic neuropathy at this time given reportedly normal OCT retinal nerve fiber layer and visual field at recent [facility] clinic visit     -Monitor, repeat MRI orbits with and without contrast in 3 months or as needed worsening  -return to clinic 3 months with OCT and GTOP  -Discontinue the Cosopt and recheck IOP in 2 weeks        Again, thank you for allowing me to participate in the care of your patient.      Sincerely,    Frantz Dunne MD  Department of Ophthalmology and Visual Neurosciences  [facility]    CC: Pacheco Andino MD  [facility]  [address] [facility] [address]  VIA Facsimile: [phone]

## 2019-12-09 NOTE — PROGRESS NOTES
Chief Complaints and History of Present Illnesses   Patient presents with     Consult For     Meningioma     Chief Complaint(s) and History of Present Illness(es)     Consult For     In both eyes.  Associated symptoms include Negative for eye pain, double   vision, headache, flashes and floaters. Additional comments: Meningioma        Comments     Michael Murphy is being seen today by the request of Dr. Geller for   Meningioma.  Patient states that he was having HA first, notes that he has   none currently, no vision symptoms.     Rebekah Hewitt COT December 9, 2019 7:28 AM     -Was seen in ED 11/23 for right eye pain and redness. MRI was performed and showed a lesion behind the right eye. Reportedly had teleconference with neuro in ED and was told needed f/u with ophthalmology  -Was seen in eye clinic at [facility]. IOP was mildly elevated at 28 so was started on cosopt in right eye. Was told next step was oculoplastics    -visual field 24-2 and OCT retinal nerve fiber layer were performed at [facility] visit and reportedly showed no visual field defect or sign of optic nerve compression     -Since starting on cosopt drops, right eye has no pain or redness    -1 prior brief episode of right eye pain within past 1 year but resolved spontaneously within a few hours    -Reports history of amblyopia right eye. Did patch during childhood. 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 No significant increase in right proptosis with bearing down  -History of amblyopia right eye since childhood. Patient reports no change in vision right eye but will  try to track down vision from visit 1yr ago  -No concern for optic neuropathy at this time given reportedly normal OCT retinal nerve fiber layer and visual field at recent [place] eye clinic visit     -Monitor, repeat MRI orbits with and without contrast in 3 months or as needed worsening  -return to clinic 3 months with OCT and GTOP  -Discontinue the Cosopt and recheck IOP in 2 weeks          Jessica Varela MD  Oculoplastics Fellow    Attending Physician Attestation:  Complete documentation of historical and exam elements from today's encounter can be found in the full encounter summary report (not reduplicated in this progress note).  I personally obtained the chief complaint(s) and history of present illness.  I confirmed and edited as necessary the review of systems, past medical/surgical history, family history, social history, and examination findings as documented by others; and I examined the patient myself.  I personally reviewed the relevant tests, images, and reports as documented above.  I formulated and edited as necessary the assessment and plan and discussed the findings and management plan with the patient and family. I personally reviewed the ophthalmic test(s) associated with this encounter, agree with the interpretation(s) as documented by the resident/fellow, and have edited the corresponding report(s) as necessary.   -Frantz Dunne MD

## 2020-02-17 ENCOUNTER — DOCUMENTATION ONLY (OUTPATIENT)
Dept: CARE COORDINATION | Facility: CLINIC | Age: 55
End: 2020-02-17

## 2020-03-22 ENCOUNTER — HEALTH MAINTENANCE LETTER (OUTPATIENT)
Age: 55
End: 2020-03-22

## 2020-03-24 ENCOUNTER — TELEPHONE (OUTPATIENT)
Dept: CARDIOLOGY | Facility: CLINIC | Age: 55
End: 2020-03-24

## 2020-03-24 DIAGNOSIS — I48.19 PERSISTENT ATRIAL FIBRILLATION (H): ICD-10-CM

## 2020-03-24 DIAGNOSIS — I48.3 TYPICAL ATRIAL FLUTTER (H): ICD-10-CM

## 2020-03-24 RX ORDER — CARVEDILOL 6.25 MG/1
6.25 TABLET ORAL 2 TIMES DAILY WITH MEALS
Qty: 180 TABLET | Refills: 3 | Status: SHIPPED | OUTPATIENT
Start: 2020-03-24 | End: 2021-07-02

## 2020-03-24 RX ORDER — FLECAINIDE ACETATE 100 MG/1
100 TABLET ORAL 2 TIMES DAILY
Qty: 180 TABLET | Refills: 3 | Status: SHIPPED | OUTPATIENT
Start: 2020-03-24 | End: 2021-07-02

## 2020-03-24 NOTE — TELEPHONE ENCOUNTER
Patient's wife, Roz, called wondering about getting refills of patient's Lisinopril and Flecainide medications. Patient is due to see Dr. Hooper again this March 2020. With current COVID-19 pandemic recommendation did not want to scheduled office visit with Dr. Hooper at this time. Will route to Dr. Hooper for recommendation on his preference as patient has order for annual Flecainide EKG. Did send refill of Coreg and Flecainide to patient's preferred pharmacy today as patient's supply was getting low.     Will call and update patient regarding Dr. Hooper recommendation.

## 2020-03-24 NOTE — TELEPHONE ENCOUNTER
Per Roz's request will send update in [employer] message to patient letting them know what Dr. Hooper would like follow-up in 3-4 months.

## 2020-07-20 DIAGNOSIS — I10 HYPERTENSION: ICD-10-CM

## 2020-07-20 RX ORDER — LISINOPRIL 10 MG/1
10 TABLET ORAL DAILY
Qty: 90 TABLET | Refills: 0 | Status: SHIPPED | OUTPATIENT
Start: 2020-07-20 | End: 2020-10-19

## 2020-10-19 DIAGNOSIS — I10 HYPERTENSION: ICD-10-CM

## 2020-10-19 RX ORDER — LISINOPRIL 10 MG/1
10 TABLET ORAL DAILY
Qty: 90 TABLET | Refills: 0 | Status: SHIPPED | OUTPATIENT
Start: 2020-10-19 | End: 2021-10-25

## 2020-10-19 NOTE — TELEPHONE ENCOUNTER
Received refill request for:  Lisinopril  Last OV was: 3/7/2019 with Dr. Hooper  Labs/EKG: last BMP 11/23/2019  F/U scheduled: overdue orders in Epic.  Note to pharmacy and refill letter sent  New script sent to: [facility]

## 2020-10-19 NOTE — LETTER
October 19, 2020       TO: Michael Murphy  [address] [place] [address]       Dear Michael Murphy,    You have requested a medication refill for Lisinopril and our records indicate that you have not been seen by one of our providers for your annual office visit, Echo and EKG.  We will refill your medication for 3 months, which will allow you enough time to be seen by one of our providers.    Please call our clinic at [phone] to schedule your appointment as soon as possible.    Thank you,    [facility]

## 2020-11-19 ENCOUNTER — HOSPITAL ENCOUNTER (OUTPATIENT)
Dept: CARDIOLOGY | Facility: CLINIC | Age: 55
Discharge: HOME | End: 2020-11-19
Attending: INTERNAL MEDICINE | Admitting: INTERNAL MEDICINE
Payer: COMMERCIAL

## 2020-11-19 DIAGNOSIS — I48.19 PERSISTENT ATRIAL FIBRILLATION (H): ICD-10-CM

## 2020-11-19 PROCEDURE — 93306 TTE W/DOPPLER COMPLETE: CPT | Mod: 26 | Performed by: INTERNAL MEDICINE

## 2020-11-19 PROCEDURE — 93306 TTE W/DOPPLER COMPLETE: CPT

## 2020-11-19 PROCEDURE — 255N000002 HC RX 255 OP 636: Performed by: INTERNAL MEDICINE

## 2020-11-19 RX ADMIN — HUMAN ALBUMIN MICROSPHERES AND PERFLUTREN 3 ML: 10; .22 INJECTION, SOLUTION INTRAVENOUS at 08:03

## 2020-11-20 ENCOUNTER — OFFICE VISIT (OUTPATIENT)
Dept: CARDIOLOGY | Facility: CLINIC | Age: 55
End: 2020-11-20
Payer: COMMERCIAL

## 2020-11-20 VITALS
HEIGHT: 70 IN | DIASTOLIC BLOOD PRESSURE: 72 MMHG | HEART RATE: 48 BPM | BODY MASS INDEX: 40.8 KG/M2 | SYSTOLIC BLOOD PRESSURE: 121 MMHG | WEIGHT: 285 LBS

## 2020-11-20 DIAGNOSIS — I48.19 PERSISTENT ATRIAL FIBRILLATION (H): Primary | ICD-10-CM

## 2020-11-20 PROCEDURE — 99214 OFFICE O/P EST MOD 30 MIN: CPT | Mod: 25 | Performed by: INTERNAL MEDICINE

## 2020-11-20 PROCEDURE — 93000 ELECTROCARDIOGRAM COMPLETE: CPT | Performed by: INTERNAL MEDICINE

## 2020-11-20 ASSESSMENT — MIFFLIN-ST. JEOR: SCORE: 2139

## 2020-11-20 NOTE — PROGRESS NOTES
HPI and Plan:   See dictation [id number]    Orders Placed This Encounter   Procedures     Follow-Up with Electrophysiologist     EKG 12-lead complete w/read - Clinics (to be scheduled)       No orders of the defined types were placed in this encounter.      There are no discontinued medications.      Encounter Diagnosis   Name Primary?     Persistent atrial fibrillation (H) Yes       CURRENT MEDICATIONS:  Current Outpatient Medications   Medication Sig Dispense Refill     carvedilol (COREG) 6.25 MG tablet Take 1 tablet (6.25 mg) by mouth 2 times daily (with meals) 180 tablet 3     dorzolamide-timolol (COSOPT) 2-0.5 % ophthalmic solution INT 1 GTT IN OD BID  5     flecainide (TAMBOCOR) 100 MG tablet Take 1 tablet (100 mg) by mouth 2 times daily 180 tablet 3     furosemide (LASIX) 20 MG tablet Take 20 mg by mouth daily        lisinopril (ZESTRIL) 10 MG tablet Take 1 tablet (10 mg) by mouth daily 90 tablet 0     metFORMIN (GLUCOPHAGE) 500 MG tablet Take 1 tablet (500 mg) by mouth daily (with breakfast) Take 2 tabs (1000 mg) with supper 60 tablet      order for [facility]     9-11 CM H20  FULL FACE AIRFIT F10 M       rivaroxaban ANTICOAGULANT (XARELTO) 20 MG TABS tablet Take 20 mg by mouth daily Prescribed by Dr Andino       simvastatin (ZOCOR) 40 MG tablet Take 40 mg by mouth At Bedtime         ALLERGIES     Allergies   Allergen Reactions     Cephalosporins Rash       PAST MEDICAL HISTORY:  Past Medical History:   Diagnosis Date     Abnormal pulmonary function test-Hx of abnormal sleep test 8/8/2016     Amblyopia      Atrial flutter (H)      Cardiomyopathy (H)      Decreased left ventricular function-EF 20-25% per echo 8-1-2016 8/8/2016     DVT (deep vein thrombosis) in pregnancy      Factor V Leiden (H) 8/8/2016     Obesity      Persistent atrial fibrillation (H) 2007     Pulmonary emboli (H) 2011     Sleep apnea        PAST SURGICAL HISTORY:  Past Surgical History:   Procedure Laterality Date     CARDIOVERSION   8/19/16    failed     CARDIOVERSION  9/27/16     EP ABLATION ATRIAL FLUTTER N/A 12/05/2016    typical atrial flutter with bidirectional block across the CT isthmus.     H ABLATION FOCAL AFIB  12/5/16       FAMILY HISTORY:  Family History   Problem Relation Age of Onset     Heart Disease Mother         stent placed     Strabismus Mother      Heart Disease Father         bypass x4     Cancer Father         lung     Strabismus Son      Glaucoma No family hx of      Macular Degeneration No family hx of        SOCIAL HISTORY:  Social History     Socioeconomic History     Marital status:      Spouse name: None     Number of children: None     Years of education: None     Highest education level: None   Occupational History     None   Social Needs     Financial resource strain: None     Food insecurity     Worry: None     Inability: None     Transportation needs     Medical: None     Non-medical: None   Tobacco Use     Smoking status: Never Smoker     Smokeless tobacco: Former User   Substance and Sexual Activity     Alcohol use: Yes     Comment: occ     Drug use: None     Sexual activity: None   Lifestyle     Physical activity     Days per week: None     Minutes per session: None     Stress: None   Relationships     Social connections     Talks on phone: None     Gets together: None     Attends Orthodox service: None     Active member of club or organization: None     Attends meetings of clubs or organizations: None     Relationship status: None     Intimate partner violence     Fear of current or ex partner: None     Emotionally abused: None     Physically abused: None     Forced sexual activity: None   Other Topics Concern     Parent/sibling w/ CABG, MI or angioplasty before 65F 55M? No      Service Not Asked     Blood Transfusions Not Asked     Caffeine Concern No     Comment: occ. pop/tea daily     Occupational Exposure Not Asked     Hobby Hazards Not Asked     Sleep Concern No     Stress Concern Yes  "    Weight Concern Yes     Comment: trying to lose weight     Special Diet No     Back Care Not Asked     Exercise Yes     Comment: 20-40 min, walking on treadmill daily     Bike Helmet Not Asked     Seat Belt Yes     Self-Exams Not Asked   Social History Narrative     None       Review of Systems:  Skin:  Negative       Eyes:  Positive for glasses cheaters  ENT:  Negative      Respiratory:  Positive for sleep apnea;CPAP HELEN but not on CPAP   Cardiovascular:  Negative for;chest pain;syncope or near-syncope;exercise intolerance;lightheadedness Positive for;edema AFIB  Gastroenterology: Negative hematochezia;melena    Genitourinary:  Negative      Musculoskeletal:  Negative      Neurologic:  Negative      Psychiatric:  Negative excessive stress;anxiety;depression always   Heme/Lymph/Imm:  Negative easy bruising    Endocrine:  Negative diabetes pre diabetic    Physical Exam:  Vitals: /72   Pulse (!) 48   Ht 1.778 m (5' 10\")   Wt 129.3 kg (285 lb)   BMI 40.89 kg/m      Constitutional:           Skin:             Head:           Eyes:           Lymph:      ENT:           Neck:           Respiratory:            Cardiac:                                                           GI:           Extremities and Muscular Skeletal:                 Neurological:           Psych:           CC  Pacheco Andino MD  [facility]  [address] [place] [address]              "

## 2020-11-20 NOTE — LETTER
11/20/2020    Pacheco Andino MD  [facility] [address]  [place] [address]    RE: Michael Murphy       Dear Colleague,    I had the pleasure of seeing Michael Murphy in the [facility].    HPI and Plan:   See dictation [id number]    Orders Placed This Encounter   Procedures     Follow-Up with Electrophysiologist     EKG 12-lead complete w/read - Clinics (to be scheduled)       No orders of the defined types were placed in this encounter.      There are no discontinued medications.      Encounter Diagnosis   Name Primary?     Persistent atrial fibrillation (H) Yes       CURRENT MEDICATIONS:  Current Outpatient Medications   Medication Sig Dispense Refill     carvedilol (COREG) 6.25 MG tablet Take 1 tablet (6.25 mg) by mouth 2 times daily (with meals) 180 tablet 3     dorzolamide-timolol (COSOPT) 2-0.5 % ophthalmic solution INT 1 GTT IN OD BID  5     flecainide (TAMBOCOR) 100 MG tablet Take 1 tablet (100 mg) by mouth 2 times daily 180 tablet 3     furosemide (LASIX) 20 MG tablet Take 20 mg by mouth daily        lisinopril (ZESTRIL) 10 MG tablet Take 1 tablet (10 mg) by mouth daily 90 tablet 0     metFORMIN (GLUCOPHAGE) 500 MG tablet Take 1 tablet (500 mg) by mouth daily (with breakfast) Take 2 tabs (1000 mg) with supper 60 tablet      order for DME DREAMSTATION     9-11 CM H20  FULL FACE AIRFIT F10 M       rivaroxaban ANTICOAGULANT (XARELTO) 20 MG TABS tablet Take 20 mg by mouth daily Prescribed by Dr Andino       simvastatin (ZOCOR) 40 MG tablet Take 40 mg by mouth At Bedtime         ALLERGIES     Allergies   Allergen Reactions     Cephalosporins Rash       PAST MEDICAL HISTORY:  Past Medical History:   Diagnosis Date     Abnormal pulmonary function test-Hx of abnormal sleep test 8/8/2016     Amblyopia      Atrial flutter (H)      Cardiomyopathy (H)      Decreased left ventricular function-EF 20-25% per echo 8-1-2016 8/8/2016     DVT (deep vein thrombosis) in pregnancy       Factor V Leiden (H) 8/8/2016     Obesity      Persistent atrial fibrillation (H) 2007     Pulmonary emboli (H) 2011     Sleep apnea        PAST SURGICAL HISTORY:  Past Surgical History:   Procedure Laterality Date     CARDIOVERSION  8/19/16    failed     CARDIOVERSION  9/27/16     EP ABLATION ATRIAL FLUTTER N/A 12/05/2016    typical atrial flutter with bidirectional block across the CT isthmus.     H ABLATION FOCAL AFIB  12/5/16       FAMILY HISTORY:  Family History   Problem Relation Age of Onset     Heart Disease Mother         stent placed     Strabismus Mother      Heart Disease Father         bypass x4     Cancer Father         lung     Strabismus Son      Glaucoma No family hx of      Macular Degeneration No family hx of        SOCIAL HISTORY:  Social History     Socioeconomic History     Marital status:      Spouse name: None     Number of children: None     Years of education: None     Highest education level: None   Occupational History     None   Social Needs     Financial resource strain: None     Food insecurity     Worry: None     Inability: None     Transportation needs     Medical: None     Non-medical: None   Tobacco Use     Smoking status: Never Smoker     Smokeless tobacco: Former User   Substance and Sexual Activity     Alcohol use: Yes     Comment: occ     Drug use: None     Sexual activity: None   Lifestyle     Physical activity     Days per week: None     Minutes per session: None     Stress: None   Relationships     Social connections     Talks on phone: None     Gets together: None     Attends [employer] service: None     Active member of club or organization: None     Attends meetings of clubs or organizations: None     Relationship status: None     Intimate partner violence     Fear of current or ex partner: None     Emotionally abused: None     Physically abused: None     Forced sexual activity: None   Other Topics Concern     Parent/sibling w/ CABG, MI or angioplasty before 65F  "55M? No      Service Not Asked     Blood Transfusions Not Asked     Caffeine Concern No     Comment: occ. pop/tea daily     Occupational Exposure Not Asked     Hobby Hazards Not Asked     Sleep Concern No     Stress Concern Yes     Weight Concern Yes     Comment: trying to lose weight     Special Diet No     Back Care Not Asked     Exercise Yes     Comment: 20-40 min, walking on treadmill daily     Bike Helmet Not Asked     Seat Belt Yes     Self-Exams Not Asked   Social History Narrative     None       Review of Systems:  Skin:  Negative       Eyes:  Positive for glasses cheaters  ENT:  Negative      Respiratory:  Positive for sleep apnea;CPAP HELEN but not on CPAP   Cardiovascular:  Negative for;chest pain;syncope or near-syncope;exercise intolerance;lightheadedness Positive for;edema AFIB  Gastroenterology: Negative hematochezia;melena    Genitourinary:  Negative      Musculoskeletal:  Negative      Neurologic:  Negative      Psychiatric:  Negative excessive stress;anxiety;depression always   Heme/Lymph/Imm:  Negative easy bruising    Endocrine:  Negative diabetes pre diabetic    Physical Exam:  Vitals: /72   Pulse (!) 48   Ht 1.778 m (5' 10\")   Wt 129.3 kg (285 lb)   BMI 40.89 kg/m      Constitutional:           Skin:             Head:           Eyes:           Lymph:      ENT:           Neck:           Respiratory:            Cardiac:                                                           GI:           Extremities and Muscular Skeletal:                 Neurological:           Psych:           CC  Pacheco Andino MD  [facility]  [address] [place] [address]                  Thank you for allowing me to participate in the care of your patient.      Sincerely,     Nathan Hooper MD     [facility]    cc:   Pacheco Andino MD  [facility]  [address] [place] [address]        "

## 2020-11-20 NOTE — LETTER
11/20/2020      Pacheco Andino MD  [facility] [address] [facility] [address]      RE: Michael Murphy       Dear Colleague,    I had the pleasure of seeing Michael Murphy in the [facility].    Service Date: 11/20/2020      HISTORY OF PRESENT ILLNESS:    It was my pleasure seeing Mr. Michael Murphy in followup of atrial fibrillation.      Michael is a 55-year-old gentleman with the following chronic medical/cardiac issues:   A.  History of persistent atrial fibrillation and typical atrial flutter with tachycardia-induced cardiomyopathy.  Catheter ablation of AF and typical atrial flutter in 12/2016.  Documentation of paroxysmal AF after the procedure.  Maintained on flecainide and a beta-blocker.  [id number] score is 1.  On long-term anticoagulation because of history of DVT/PE.   B.  Tachycardia-induced cardiomyopathy with EF of 20%-30%.  EF normalized after restoration of sinus rhythm.   C.  Severe obesity.  Recent significant weight loss with diet.   D.  History of DVT/PE due to factor V Leiden mutation.  He had been on warfarin for years, more recently switched to rivaroxaban.   E.  DM type II , on metformin, lisinopril, statin.   F.  Sleep apnea, treated with CPAP.   G.  Dyslipidemia.      Michael has been feeling well.  This is the first time I am seeing him in 20 months.  He has not had palpitation lasting over a few seconds.  He enjoys good energy level and continues to use his CPAP regularly.      He has managed to lose over 60 pounds in the past 2 years.  He has accomplished that with diet.  I congratulated him on this very impressive result.  He says he will try to keep this up.        PHYSICAL EXAMINATION:   VITAL SIGNS:  Blood pressure 121/72, heart rate 48 and regular, weight 129 kg (285 pounds), height 178 cm.   GENERAL:  He is a pleasant gentleman who is moderately overweight and in no distress.   NECK:  Supple, thick.  No carotid bruits.   LUNGS:  Clear.    CARDIOVASCULAR:  Normal JVP, regular rhythm, without gallop, murmur or rub.   EXTREMITIES:  Trace edema with skin changes consistent with chronic venous stasis.   NEUROLOGIC:  Alert and oriented x3.   VASCULAR: 2+ radials and carotids bilaterally.        DIAGNOSTIC STUDIES:     - Echocardiogram from earlier in the week showed normal LV function with EF 55%-60%.  Normal LV wall thickness.  Normal right ventricle.  No significant valvular abnormalities.   - 12-lead ECG today showed sinus bradycardia at 47, otherwise normal.        IMPRESSION:   1.  History of persistent atrial arrhythmias and tachycardia-induced cardiomyopathy.  I am pleased to see that Michael continues to do well.  He remains on flecainide 100 mg b.i.d.   EF is normal.   2.  History of DVT/PE.  He is currently on rivaroxaban.  He has tolerated without issues.   3.  Obesity.  He was congratulated on his significant weight loss in the past 1-2 years and was encouraged to keep it up.   4.  Obstructive sleep apnea.  He has been compliant with CPAP and is eager to continue this.      RECOMMENDATIONS:   A.  Continue current cardiac medications.   B.  Follow-up in 1 year with provider visit and ECG.  I encouraged him to call sooner should he have problems in the interim.      It was my pleasure seeing Michael today.          JOE VALERO MD, FACC          cc:   Pacheco Andino MD   [facility]   [address] [place] [address]            D: 2020   T: 2020   MT: SIM      Name:     MICHAEL WELLS   MRN:      [id number]        Account:      [id number]   :      1965           Service Date: 2020      Document: [id number]            Outpatient Encounter Medications as of 2020   Medication Sig Dispense Refill     carvedilol (COREG) 6.25 MG tablet Take 1 tablet (6.25 mg) by mouth 2 times daily (with meals) 180 tablet 3     dorzolamide-timolol (COSOPT) 2-0.5 % ophthalmic solution INT 1 GTT IN OD BID  5      flecainide (TAMBOCOR) 100 MG tablet Take 1 tablet (100 mg) by mouth 2 times daily 180 tablet 3     furosemide (LASIX) 20 MG tablet Take 20 mg by mouth daily        lisinopril (ZESTRIL) 10 MG tablet Take 1 tablet (10 mg) by mouth daily 90 tablet 0     metFORMIN (GLUCOPHAGE) 500 MG tablet Take 1 tablet (500 mg) by mouth daily (with breakfast) Take 2 tabs (1000 mg) with supper 60 tablet      order for DME DREAMSTATION     9-11 CM H20  FULL FACE AIRFIT F10 M       rivaroxaban ANTICOAGULANT (XARELTO) 20 MG TABS tablet Take 20 mg by mouth daily Prescribed by Dr Andino       simvastatin (ZOCOR) 40 MG tablet Take 40 mg by mouth At Bedtime       No facility-administered encounter medications on file as of 11/20/2020.        Again, thank you for allowing me to participate in the care of your patient.      Sincerely,    Nathan Hooper MD     [facility]

## 2020-11-21 NOTE — PROGRESS NOTES
Service Date: 11/20/2020      HISTORY OF PRESENT ILLNESS:    It was my pleasure seeing Mr. Michael Murphy in followup of atrial fibrillation.      Michael is a 55-year-old gentleman with the following chronic medical/cardiac issues:   A.  History of persistent atrial fibrillation and typical atrial flutter with tachycardia-induced cardiomyopathy.  Catheter ablation of AF and typical atrial flutter in 12/2016.  Documentation of paroxysmal AF after the procedure.  Maintained on flecainide and a beta-blocker.  [id number] score is 1.  On long-term anticoagulation because of history of DVT/PE.   B.  Tachycardia-induced cardiomyopathy with EF of 20%-30%.  EF normalized after restoration of sinus rhythm.   C.  Severe obesity.  Recent significant weight loss with diet.   D.  History of DVT/PE due to factor V Leiden mutation.  He had been on warfarin for years, more recently switched to rivaroxaban.   E.  DM type II , on metformin, lisinopril, statin.   F.  Sleep apnea, treated with CPAP.   G.  Dyslipidemia.      Michael has been feeling well.  This is the first time I am seeing him in 20 months.  He has not had palpitation lasting over a few seconds.  He enjoys good energy level and continues to use his CPAP regularly.      He has managed to lose over 60 pounds in the past 2 years.  He has accomplished that with diet.  I congratulated him on this very impressive result.  He says he will try to keep this up.        PHYSICAL EXAMINATION:   VITAL SIGNS:  Blood pressure 121/72, heart rate 48 and regular, weight 129 kg (285 pounds), height 178 cm.   GENERAL:  He is a pleasant gentleman who is moderately overweight and in no distress.   NECK:  Supple, thick.  No carotid bruits.   LUNGS:  Clear.   CARDIOVASCULAR:  Normal JVP, regular rhythm, without gallop, murmur or rub.   EXTREMITIES:  Trace edema with skin changes consistent with chronic venous stasis.   NEUROLOGIC:  Alert and oriented x3.   VASCULAR: 2+ radials and carotids  bilaterally.        DIAGNOSTIC STUDIES:     - Echocardiogram from earlier in the week showed normal LV function with EF 55%-60%.  Normal LV wall thickness.  Normal right ventricle.  No significant valvular abnormalities.   - 12-lead ECG today showed sinus bradycardia at 47, otherwise normal.        IMPRESSION:   1.  History of persistent atrial arrhythmias and tachycardia-induced cardiomyopathy.  I am pleased to see that Michael continues to do well.  He remains on flecainide 100 mg b.i.d.   EF is normal.   2.  History of DVT/PE.  He is currently on rivaroxaban.  He has tolerated without issues.   3.  Obesity.  He was congratulated on his significant weight loss in the past 1-2 years and was encouraged to keep it up.   4.  Obstructive sleep apnea.  He has been compliant with CPAP and is eager to continue this.      RECOMMENDATIONS:   A.  Continue current cardiac medications.   B.  Follow-up in 1 year with provider visit and ECG.  I encouraged him to call sooner should he have problems in the interim.      It was my pleasure seeing Michael today.          JOE VALERO MD, [facility]          cc:   [place] [address]            D: 2020   T: 2020   MT: SIM      Name:     MICHAEL WELLS   MRN:      [id number]        Account:      [id number]   :      1965           Service Date: 2020      Document: [id number]

## 2021-01-04 ENCOUNTER — APPOINTMENT (OUTPATIENT)
Dept: URBAN - METROPOLITAN AREA CLINIC 253 | Age: 56
Setting detail: DERMATOLOGY
End: 2021-01-05

## 2021-01-04 VITALS — HEIGHT: 70 IN | RESPIRATION RATE: 15 BRPM | WEIGHT: 289 LBS

## 2021-01-04 DIAGNOSIS — L91.8 OTHER HYPERTROPHIC DISORDERS OF THE SKIN: ICD-10-CM

## 2021-01-04 DIAGNOSIS — I87.2 VENOUS INSUFFICIENCY (CHRONIC) (PERIPHERAL): ICD-10-CM

## 2021-01-04 DIAGNOSIS — L82.1 OTHER SEBORRHEIC KERATOSIS: ICD-10-CM

## 2021-01-04 DIAGNOSIS — L81.4 OTHER MELANIN HYPERPIGMENTATION: ICD-10-CM

## 2021-01-04 DIAGNOSIS — L72.8 OTHER FOLLICULAR CYSTS OF THE SKIN AND SUBCUTANEOUS TISSUE: ICD-10-CM

## 2021-01-04 DIAGNOSIS — D18.0 HEMANGIOMA: ICD-10-CM

## 2021-01-04 DIAGNOSIS — D22 MELANOCYTIC NEVI: ICD-10-CM

## 2021-01-04 DIAGNOSIS — Z71.89 OTHER SPECIFIED COUNSELING: ICD-10-CM

## 2021-01-04 PROBLEM — D18.01 HEMANGIOMA OF SKIN AND SUBCUTANEOUS TISSUE: Status: ACTIVE | Noted: 2021-01-04

## 2021-01-04 PROBLEM — D23.72 OTHER BENIGN NEOPLASM OF SKIN OF LEFT LOWER LIMB, INCLUDING HIP: Status: ACTIVE | Noted: 2021-01-04

## 2021-01-04 PROBLEM — D22.5 MELANOCYTIC NEVI OF TRUNK: Status: ACTIVE | Noted: 2021-01-04

## 2021-01-04 PROCEDURE — 99202 OFFICE O/P NEW SF 15 MIN: CPT

## 2021-01-04 PROCEDURE — OTHER COUNSELING: OTHER

## 2021-01-04 PROCEDURE — OTHER ADDITIONAL NOTES: OTHER

## 2021-01-04 ASSESSMENT — LOCATION SIMPLE DESCRIPTION DERM
LOCATION SIMPLE: UPPER BACK
LOCATION SIMPLE: LEFT AXILLARY VAULT
LOCATION SIMPLE: RIGHT AXILLARY VAULT
LOCATION SIMPLE: RIGHT PRETIBIAL REGION
LOCATION SIMPLE: LEFT PRETIBIAL REGION
LOCATION SIMPLE: RIGHT UPPER BACK

## 2021-01-04 ASSESSMENT — LOCATION DETAILED DESCRIPTION DERM
LOCATION DETAILED: LEFT AXILLARY VAULT
LOCATION DETAILED: INFERIOR THORACIC SPINE
LOCATION DETAILED: RIGHT SUPERIOR MEDIAL UPPER BACK
LOCATION DETAILED: SUPERIOR THORACIC SPINE
LOCATION DETAILED: LEFT DISTAL PRETIBIAL REGION
LOCATION DETAILED: RIGHT AXILLARY VAULT
LOCATION DETAILED: RIGHT DISTAL PRETIBIAL REGION

## 2021-01-04 ASSESSMENT — LOCATION ZONE DERM
LOCATION ZONE: AXILLAE
LOCATION ZONE: TRUNK
LOCATION ZONE: LEG

## 2021-01-04 NOTE — PROCEDURE: ADDITIONAL NOTES
Detail Level: Detailed
Additional Notes: Recommended he use his compression stockings. Further management with his cardiologist and primary care provider is needed.
Render Risk Assessment In Note?: no
Additional Notes: Discussed 30 minute excision.

## 2021-01-04 NOTE — PROCEDURE: COUNSELING
Detail Level: Detailed
Detail Level: Generalized
Patient Specific Counseling (Will Not Stick From Patient To Patient): Discussed removal if lesions become inflamed and irritated with clothing. Also discussed option of cosmetic removal.
Detail Level: Simple

## 2021-05-15 ENCOUNTER — HEALTH MAINTENANCE LETTER (OUTPATIENT)
Age: 56
End: 2021-05-15

## 2021-07-02 DIAGNOSIS — I48.3 TYPICAL ATRIAL FLUTTER (H): ICD-10-CM

## 2021-07-02 DIAGNOSIS — I48.19 PERSISTENT ATRIAL FIBRILLATION (H): ICD-10-CM

## 2021-07-02 RX ORDER — FLECAINIDE ACETATE 100 MG/1
100 TABLET ORAL 2 TIMES DAILY
Qty: 180 TABLET | Refills: 1 | Status: SHIPPED | OUTPATIENT
Start: 2021-07-02 | End: 2021-10-25

## 2021-07-02 RX ORDER — CARVEDILOL 6.25 MG/1
6.25 TABLET ORAL 2 TIMES DAILY WITH MEALS
Qty: 180 TABLET | Refills: 1 | Status: SHIPPED | OUTPATIENT
Start: 2021-07-02 | End: 2021-10-25

## 2021-08-23 ENCOUNTER — TRANSFERRED RECORDS (OUTPATIENT)
Dept: HEALTH INFORMATION MANAGEMENT | Facility: CLINIC | Age: 56
End: 2021-08-23
Payer: COMMERCIAL

## 2021-09-04 ENCOUNTER — HEALTH MAINTENANCE LETTER (OUTPATIENT)
Age: 56
End: 2021-09-04

## 2021-10-25 ENCOUNTER — TELEPHONE (OUTPATIENT)
Dept: CARDIOLOGY | Facility: CLINIC | Age: 56
End: 2021-10-25

## 2021-10-25 DIAGNOSIS — I48.19 PERSISTENT ATRIAL FIBRILLATION (H): ICD-10-CM

## 2021-10-25 DIAGNOSIS — I48.3 TYPICAL ATRIAL FLUTTER (H): ICD-10-CM

## 2021-10-25 DIAGNOSIS — I10 HYPERTENSION: ICD-10-CM

## 2021-10-25 RX ORDER — CARVEDILOL 6.25 MG/1
6.25 TABLET ORAL 2 TIMES DAILY WITH MEALS
Qty: 180 TABLET | Refills: 0 | Status: SHIPPED | OUTPATIENT
Start: 2021-10-25 | End: 2021-11-23

## 2021-10-25 RX ORDER — LISINOPRIL 10 MG/1
10 TABLET ORAL DAILY
Qty: 90 TABLET | Refills: 0 | Status: SHIPPED | OUTPATIENT
Start: 2021-10-25 | End: 2021-11-23 | Stop reason: SINTOL

## 2021-10-25 RX ORDER — FLECAINIDE ACETATE 100 MG/1
100 TABLET ORAL 2 TIMES DAILY
Qty: 180 TABLET | Refills: 0 | Status: SHIPPED | OUTPATIENT
Start: 2021-10-25 | End: 2021-11-23

## 2021-10-25 NOTE — TELEPHONE ENCOUNTER
Patient called requesting refills for flecainide, carvedilol and lisinopril.  Patient due for annual follow up.  Scheduled patient to see LEO on 11/23.  Sent to preferred pharmacy 90 day supply to above medications until seen.  SID Colón

## 2021-11-22 NOTE — PROGRESS NOTES
Cardiology Clinic Progress Note    Service Date: 11/23/21    Primary Cardiologist: Dr. Hooper      Reason for Visit: Annual      HPI:   I had the pleasure of seeing Mr. Michael Murphy in the clinic today and he is a very pleasant 56 year old male with a past medical history notable for    1. Persistent atrial fibrillation and typical atrial flutter.  S/p catheter ablation of atrial fibrillation and typical atrial flutter (12/2016)  2. severe obesity  3. Tachycardia induced cardiomyopathy.  Resolved in  4. history of DVT/PE due to factor V Leiden mutation  5. Diabetes type 2   6. sleep apnea      Diagnostics  I have personally reviewed the following reports  ECHO (11/2020) The left ventricle is normal in size. There is normal left ventricular wall thickness. Left ventricular systolic function is normal. The visual ejection fraction is estimated at 55-60%. Left ventricular diastolic function is normal. No regional wall motion abnormalities noted. There is no thrombus seen in the left ventricle. The right ventricle is normal size. The right ventricular systolic function is normal Trace mitral and tricuspid regurgitation. Mild dilatation of the aortic root. No pericardial effusion. In comparison to the previous report dated 03/17/2017, the findings are similar.      In November 2020, patient met with Dr. Hooper was doing well and flecainide and Xarelto    Today, he reports he is having occasional fluttering occurring 2-3x/month and lasting 5-10 minutes with no other associated symptoms. His heart rate is often in the 50's and he denies shortness of breath, dizziness or chest pain.   He is occasionally exercising and denies chest pain, shortness of breath, dyspnea on exertion, orthopnea, PND, lower extremity edema, palpitations, dizziness.   Reports taking medications as prescribed including Xarelto and denies bleeding and sign/symptoms of stroke.     ASSESSMENT AND PLAN:    Persistent atrial fibrillation    S/p PVI  ablation and CTI ablation (2016)    Maintained on flecainide 100 mg BID and coreg 6.25 mg BID.  He is having occasional palpitations.  His ECG today showed sinus bradycardia with ventricular rate of 50 bpm, OS=355 ms, DSB=259 which is similar to other ECGs.    We discussed when to call the clinic for bradycardia symptoms and when to call for atrial fibrillation.  At this time he is asymptomatic with bradycardia and the palpitations are intermittent.      For anticoagulation for CHADS VASC 2 (HF, DM) he is taking Xarelto 20 mg daily,  Requested recent labs from primary clinic    Discussed weight loss and exercise.     Cardiomyopathy    ECHO (2020) showed normal EF    Currently on lisinopril which is causing a cough and will change to losartan 25 mg daily.  He is also taking coreg.     Obesity    BMI=47    Discussed weight loss and pt's struggles with his weight.     HELEN    wears CPA     Plan:    Due to cough, stop lisinopril 10 mg daily and start losartan 25 mg daily    Requested labs from primary care    Continue with current medications    Follow up with Dr. Hooper in 1 year with ECG and monitor prior    Please call the clinic if questions or problems arise      Thank you for the opportunity to participate in this pleasant patient's care. We would be happy to see him sooner if needed for any concerns in the meantime.          BEKA Kessler [facility]  Text Page  (M-F 8:00 am - 4:30 pm)    Orders this Visit:  Orders Placed This Encounter   Procedures     Follow-Up with Electrophysiologist     EKG 12-lead complete w/read - Clinics (to be scheduled)     Orders Placed This Encounter   Medications     losartan (COZAAR) 25 MG tablet     Sig: Take 1 tablet (25 mg) by mouth daily     Dispense:  90 tablet     Refill:  3     carvedilol (COREG) 6.25 MG tablet     Sig: Take 1 tablet (6.25 mg) by mouth 2 times daily (with meals)     Dispense:  180 tablet     Refill:  3     flecainide (TAMBOCOR) 100 MG tablet      Sig: Take 1 tablet (100 mg) by mouth 2 times daily     Dispense:  180 tablet     Refill:  3     Medications Discontinued During This Encounter   Medication Reason     dorzolamide-timolol (COSOPT) 2-0.5 % ophthalmic solution Medication Reconciliation Clean Up     lisinopril (ZESTRIL) 10 MG tablet Side effects     carvedilol (COREG) 6.25 MG tablet Reorder     flecainide (TAMBOCOR) 100 MG tablet Reorder     Encounter Diagnoses   Name Primary?     Persistent atrial fibrillation (H)      Benign essential hypertension Yes     Cardiomyopathy, unspecified type (H)      Morbid obesity (H)      Typical atrial flutter (H)      Encounter for monitoring flecainide therapy        CURRENT MEDICATIONS:  Current Outpatient Medications   Medication Sig Dispense Refill     carvedilol (COREG) 6.25 MG tablet Take 1 tablet (6.25 mg) by mouth 2 times daily (with meals) 180 tablet 3     flecainide (TAMBOCOR) 100 MG tablet Take 1 tablet (100 mg) by mouth 2 times daily 180 tablet 3     furosemide (LASIX) 20 MG tablet Take 20 mg by mouth daily        losartan (COZAAR) 25 MG tablet Take 1 tablet (25 mg) by mouth daily 90 tablet 3     metFORMIN (GLUCOPHAGE) 500 MG tablet Take 1 tablet (500 mg) by mouth daily (with breakfast) Take 2 tabs (1000 mg) with supper 60 tablet      rivaroxaban ANTICOAGULANT (XARELTO) 20 MG TABS tablet Take 20 mg by mouth daily Prescribed by Dr Andino       simvastatin (ZOCOR) 40 MG tablet Take 40 mg by mouth At Bedtime       order for [facility] DREAMSTATION     9-11 CM H20  FULL FACE AIRFIT F10 M (Patient not taking: Reported on 11/23/2021)         ALLERGIES  Allergies   Allergen Reactions     Cephalosporins Rash       PAST MEDICAL, SURGICAL, SOCIAL FAMILY HISTORY:  History was reviewed and updated as needed, see medical record.    Review of Systems:  Skin:  Negative     Eyes:  Positive for glasses  ENT:  Negative    Respiratory:  Positive for cough;sleep apnea;CPAP  Cardiovascular:    Positive  "for;palpitations  Gastroenterology: Negative    Genitourinary:  Negative    Musculoskeletal:  Positive for joint pain  Neurologic:  Negative    Psychiatric:  Negative    Heme/Lymph/Imm:  Positive for allergies  Endocrine:  Negative       Physical Exam:  Vitals: /76   Pulse 52   Ht 1.778 m (5' 10\")   Wt 149.5 kg (329 lb 8 oz)   BMI 47.28 kg/m     Wt Readings from Last 4 Encounters:   11/23/21 149.5 kg (329 lb 8 oz)   11/20/20 129.3 kg (285 lb)   03/07/19 (!) 151.9 kg (334 lb 14.4 oz)   03/09/18 (!) 142 kg (313 lb)     CONSTITUTIONAL: Appears his stated age, obese, and in no acute distress.  HEENT: Pupils equal, round. Sclerae nonicteric.    NECK: Supple, no masses appreciated.   C/V:  Regular rate and rhythm distant  RESP: Respirations are unlabored. Lungs are clear to auscultation bilaterally without wheezing, rales, or rhonchi.  GI: Abdomen soft, non-tender, non-distended.  EXTREM:  No clubbing, cyanosis, or lower extremity edema bilaterally.   NEURO: Alert and oriented, cooperative. Gait steady. No gross focal deficits.   PSYCH: Affect appropriate. Mentation normal. Responds to questions appropriately.  SKIN: Warm and dry. No apparent rashes or bruising.     Recent Lab Results:  No recent labs.     CC  No referring provider defined for this encounter.    This note was completed in part using Dragon voice recognition software. Although reviewed after completion, some word and grammatical errors may occur.  "

## 2021-11-23 ENCOUNTER — OFFICE VISIT (OUTPATIENT)
Dept: CARDIOLOGY | Facility: CLINIC | Age: 56
End: 2021-11-23
Payer: COMMERCIAL

## 2021-11-23 ENCOUNTER — TELEPHONE (OUTPATIENT)
Dept: CARDIOLOGY | Facility: CLINIC | Age: 56
End: 2021-11-23

## 2021-11-23 VITALS
DIASTOLIC BLOOD PRESSURE: 76 MMHG | HEART RATE: 52 BPM | SYSTOLIC BLOOD PRESSURE: 130 MMHG | WEIGHT: 315 LBS | HEIGHT: 70 IN | BODY MASS INDEX: 45.1 KG/M2

## 2021-11-23 DIAGNOSIS — I48.19 PERSISTENT ATRIAL FIBRILLATION (H): Primary | ICD-10-CM

## 2021-11-23 DIAGNOSIS — I42.9 CARDIOMYOPATHY, UNSPECIFIED TYPE (H): ICD-10-CM

## 2021-11-23 DIAGNOSIS — Z51.81 ENCOUNTER FOR MONITORING FLECAINIDE THERAPY: ICD-10-CM

## 2021-11-23 DIAGNOSIS — E66.01 MORBID OBESITY (H): ICD-10-CM

## 2021-11-23 DIAGNOSIS — Z79.899 ENCOUNTER FOR MONITORING FLECAINIDE THERAPY: ICD-10-CM

## 2021-11-23 PROCEDURE — 99214 OFFICE O/P EST MOD 30 MIN: CPT | Mod: 25 | Performed by: NURSE PRACTITIONER

## 2021-11-23 PROCEDURE — 93000 ELECTROCARDIOGRAM COMPLETE: CPT | Performed by: INTERNAL MEDICINE

## 2021-11-23 RX ORDER — FLECAINIDE ACETATE 100 MG/1
100 TABLET ORAL 2 TIMES DAILY
Qty: 180 TABLET | Refills: 3 | Status: SHIPPED | OUTPATIENT
Start: 2021-11-23 | End: 2022-12-02

## 2021-11-23 RX ORDER — LOSARTAN POTASSIUM 25 MG/1
25 TABLET ORAL DAILY
Qty: 90 TABLET | Refills: 3 | Status: SHIPPED | OUTPATIENT
Start: 2021-11-23 | End: 2023-01-26

## 2021-11-23 RX ORDER — CARVEDILOL 6.25 MG/1
6.25 TABLET ORAL 2 TIMES DAILY WITH MEALS
Qty: 180 TABLET | Refills: 3 | Status: SHIPPED | OUTPATIENT
Start: 2021-11-23 | End: 2022-12-02

## 2021-11-23 ASSESSMENT — MIFFLIN-ST. JEOR: SCORE: 2335.85

## 2021-11-23 NOTE — TELEPHONE ENCOUNTER
Can you request recent labs from [facility] for this patient.  Thank you,  Niya Shaver, BEKA CNP on 11/23/2021 at 8:29 AM

## 2021-11-23 NOTE — TELEPHONE ENCOUNTER
Requested OV note and labs from PMD in August. JNelsonSID     Late noteLabs faxed to Niya in [place]. RICHIE 11/23

## 2021-11-23 NOTE — LETTER
11/23/2021    Pacheco Andino MD  [facility] [place] [address]  [facility] [address]    RE: Michael Murphy       Dear Colleague,    I had the pleasure of seeing Michael Murphy in the [facility].        Cardiology Clinic Progress Note    Service Date: 11/23/21    Primary Cardiologist: Dr. Hooper      Reason for Visit: Annual      HPI:   I had the pleasure of seeing Mr. Michael Murphy in the clinic today and he is a very pleasant 56 year old male with a past medical history notable for    1. Persistent atrial fibrillation and typical atrial flutter.  S/p catheter ablation of atrial fibrillation and typical atrial flutter (12/2016)  2. severe obesity  3. Tachycardia induced cardiomyopathy.  Resolved in  4. history of DVT/PE due to factor V Leiden mutation  5. Diabetes type 2   6. sleep apnea      Diagnostics  I have personally reviewed the following reports  ECHO (11/2020) The left ventricle is normal in size. There is normal left ventricular wall thickness. Left ventricular systolic function is normal. The visual ejection fraction is estimated at 55-60%. Left ventricular diastolic function is normal. No regional wall motion abnormalities noted. There is no thrombus seen in the left ventricle. The right ventricle is normal size. The right ventricular systolic function is normal Trace mitral and tricuspid regurgitation. Mild dilatation of the aortic root. No pericardial effusion. In comparison to the previous report dated 03/17/2017, the findings are similar.      In November 2020, patient met with Dr. Hooper was doing well and flecainide and Xarelto    Today, he reports he is having occasional fluttering occurring 2-3x/month and lasting 5-10 minutes with no other associated symptoms. His heart rate is often in the 50's and he denies shortness of breath, dizziness or chest pain.   He is occasionally exercising and denies chest pain, shortness of breath,  dyspnea on exertion, orthopnea, PND, lower extremity edema, palpitations, dizziness.   Reports taking medications as prescribed including Xarelto and denies bleeding and sign/symptoms of stroke.     ASSESSMENT AND PLAN:    Persistent atrial fibrillation    S/p PVI ablation and CTI ablation (2016)    Maintained on flecainide 100 mg BID and coreg 6.25 mg BID.  He is having occasional palpitations.  His ECG today showed sinus bradycardia with ventricular rate of 50 bpm, TW=119 ms, YVV=957 which is similar to other ECGs.    We discussed when to call the clinic for bradycardia symptoms and when to call for atrial fibrillation.  At this time he is asymptomatic with bradycardia and the palpitations are intermittent.      For anticoagulation for CHADS VASC 2 (HF, DM) he is taking Xarelto 20 mg daily,  Requested recent labs from primary clinic    Discussed weight loss and exercise.     Cardiomyopathy    ECHO (2020) showed normal EF    Currently on lisinopril which is causing a cough and will change to losartan 25 mg daily.  He is also taking coreg.     Obesity    BMI=47    Discussed weight loss and pt's struggles with his weight.     HELEN    wears CPA     Plan:    Due to cough, stop lisinopril 10 mg daily and start losartan 25 mg daily    Requested labs from primary care    Continue with current medications    Follow up with Dr. Hooper in 1 year with ECG and monitor prior    Please call the clinic if questions or problems arise      Thank you for the opportunity to participate in this pleasant patient's care. We would be happy to see him sooner if needed for any concerns in the meantime.          BEKA Kessler CNP  [facility]  Text Page  (M-F 8:00 am - 4:30 pm)    Orders this Visit:  Orders Placed This Encounter   Procedures     Follow-Up with Electrophysiologist     EKG 12-lead complete w/read - Clinics (to be scheduled)     Orders Placed This Encounter   Medications     losartan (COZAAR) 25 MG tablet     Sig: Take 1  tablet (25 mg) by mouth daily     Dispense:  90 tablet     Refill:  3     carvedilol (COREG) 6.25 MG tablet     Sig: Take 1 tablet (6.25 mg) by mouth 2 times daily (with meals)     Dispense:  180 tablet     Refill:  3     flecainide (TAMBOCOR) 100 MG tablet     Sig: Take 1 tablet (100 mg) by mouth 2 times daily     Dispense:  180 tablet     Refill:  3     Medications Discontinued During This Encounter   Medication Reason     dorzolamide-timolol (COSOPT) 2-0.5 % ophthalmic solution Medication Reconciliation Clean Up     lisinopril (ZESTRIL) 10 MG tablet Side effects     carvedilol (COREG) 6.25 MG tablet Reorder     flecainide (TAMBOCOR) 100 MG tablet Reorder     Encounter Diagnoses   Name Primary?     Persistent atrial fibrillation (H)      Benign essential hypertension Yes     Cardiomyopathy, unspecified type (H)      Morbid obesity (H)      Typical atrial flutter (H)      Encounter for monitoring flecainide therapy        CURRENT MEDICATIONS:  Current Outpatient Medications   Medication Sig Dispense Refill     carvedilol (COREG) 6.25 MG tablet Take 1 tablet (6.25 mg) by mouth 2 times daily (with meals) 180 tablet 3     flecainide (TAMBOCOR) 100 MG tablet Take 1 tablet (100 mg) by mouth 2 times daily 180 tablet 3     furosemide (LASIX) 20 MG tablet Take 20 mg by mouth daily        losartan (COZAAR) 25 MG tablet Take 1 tablet (25 mg) by mouth daily 90 tablet 3     metFORMIN (GLUCOPHAGE) 500 MG tablet Take 1 tablet (500 mg) by mouth daily (with breakfast) Take 2 tabs (1000 mg) with supper 60 tablet      rivaroxaban ANTICOAGULANT (XARELTO) 20 MG TABS tablet Take 20 mg by mouth daily Prescribed by Dr Andino       simvastatin (ZOCOR) 40 MG tablet Take 40 mg by mouth At Bedtime       order for DME DREAMSTATION     9-11 CM H20  FULL FACE AIRFIT F10 M (Patient not taking: Reported on 11/23/2021)         ALLERGIES  Allergies   Allergen Reactions     Cephalosporins Rash       PAST MEDICAL, SURGICAL, SOCIAL FAMILY  "HISTORY:  History was reviewed and updated as needed, see medical record.    Review of Systems:  Skin:  Negative     Eyes:  Positive for glasses  ENT:  Negative    Respiratory:  Positive for cough;sleep apnea;CPAP  Cardiovascular:    Positive for;palpitations  Gastroenterology: Negative    Genitourinary:  Negative    Musculoskeletal:  Positive for joint pain  Neurologic:  Negative    Psychiatric:  Negative    Heme/Lymph/Imm:  Positive for allergies  Endocrine:  Negative       Physical Exam:  Vitals: /76   Pulse 52   Ht 1.778 m (5' 10\")   Wt 149.5 kg (329 lb 8 oz)   BMI 47.28 kg/m     Wt Readings from Last 4 Encounters:   11/23/21 149.5 kg (329 lb 8 oz)   11/20/20 129.3 kg (285 lb)   03/07/19 (!) 151.9 kg (334 lb 14.4 oz)   03/09/18 (!) 142 kg (313 lb)     CONSTITUTIONAL: Appears his stated age, obese, and in no acute distress.  HEENT: Pupils equal, round. Sclerae nonicteric.    NECK: Supple, no masses appreciated.   C/V:  Regular rate and rhythm distant  RESP: Respirations are unlabored. Lungs are clear to auscultation bilaterally without wheezing, rales, or rhonchi.  GI: Abdomen soft, non-tender, non-distended.  EXTREM:  No clubbing, cyanosis, or lower extremity edema bilaterally.   NEURO: Alert and oriented, cooperative. Gait steady. No gross focal deficits.   PSYCH: Affect appropriate. Mentation normal. Responds to questions appropriately.  SKIN: Warm and dry. No apparent rashes or bruising.     Recent Lab Results:  No recent labs.     CC  No referring provider defined for this encounter.    This note was completed in part using Dragon voice recognition software. Although reviewed after completion, some word and grammatical errors may occur.    Thank you for allowing me to participate in the care of your patient.      Sincerely,     BEKA Kessler [facility]  cc:   No referring provider defined for this encounter.        "

## 2021-11-23 NOTE — PATIENT INSTRUCTIONS
If you have problems or questions please call the RNs (Jo Ann Palomo, & Jia) at [phone]

## 2022-06-11 ENCOUNTER — HEALTH MAINTENANCE LETTER (OUTPATIENT)
Age: 57
End: 2022-06-11

## 2022-10-16 ENCOUNTER — HEALTH MAINTENANCE LETTER (OUTPATIENT)
Age: 57
End: 2022-10-16

## 2022-12-02 DIAGNOSIS — I48.19 PERSISTENT ATRIAL FIBRILLATION (H): ICD-10-CM

## 2022-12-02 DIAGNOSIS — I48.19 PERSISTENT ATRIAL FIBRILLATION (H): Primary | ICD-10-CM

## 2022-12-02 RX ORDER — FLECAINIDE ACETATE 100 MG/1
100 TABLET ORAL 2 TIMES DAILY
Qty: 180 TABLET | Refills: 0 | Status: SHIPPED | OUTPATIENT
Start: 2022-12-02 | End: 2023-03-06

## 2022-12-02 RX ORDER — CARVEDILOL 6.25 MG/1
6.25 TABLET ORAL 2 TIMES DAILY WITH MEALS
Qty: 180 TABLET | Refills: 0 | Status: SHIPPED | OUTPATIENT
Start: 2022-12-02 | End: 2023-03-06

## 2022-12-04 ENCOUNTER — MYC MEDICAL ADVICE (OUTPATIENT)
Dept: CARDIOLOGY | Facility: CLINIC | Age: 57
End: 2022-12-04

## 2022-12-22 ENCOUNTER — HOSPITAL ENCOUNTER (OUTPATIENT)
Dept: CARDIOLOGY | Facility: CLINIC | Age: 57
Discharge: HOME | End: 2022-12-22
Attending: NURSE PRACTITIONER | Admitting: NURSE PRACTITIONER
Payer: COMMERCIAL

## 2022-12-22 DIAGNOSIS — Z79.899 ENCOUNTER FOR MONITORING FLECAINIDE THERAPY: ICD-10-CM

## 2022-12-22 DIAGNOSIS — Z51.81 ENCOUNTER FOR MONITORING FLECAINIDE THERAPY: ICD-10-CM

## 2022-12-22 DIAGNOSIS — I48.19 PERSISTENT ATRIAL FIBRILLATION (H): ICD-10-CM

## 2022-12-22 PROCEDURE — 93248 EXT ECG>7D<15D REV&INTERPJ: CPT | Performed by: INTERNAL MEDICINE

## 2022-12-22 PROCEDURE — 93246 EXT ECG>7D<15D RECORDING: CPT

## 2023-01-06 ENCOUNTER — TELEPHONE (OUTPATIENT)
Dept: CARDIOLOGY | Facility: CLINIC | Age: 58
End: 2023-01-06

## 2023-01-06 NOTE — TELEPHONE ENCOUNTER
Faxed request to PCP requesting recent OV and labs for upcoming appt with Dr Hooper on 1/16. SID Colón

## 2023-01-09 NOTE — TELEPHONE ENCOUNTER
PCP records arrived dated 8/23/21 and labs done 9/23/2. Labs sent to lab team team to have entered into epic. Nino

## 2023-01-16 ENCOUNTER — OFFICE VISIT (OUTPATIENT)
Dept: CARDIOLOGY | Facility: CLINIC | Age: 58
End: 2023-01-16
Payer: COMMERCIAL

## 2023-01-16 VITALS
DIASTOLIC BLOOD PRESSURE: 72 MMHG | SYSTOLIC BLOOD PRESSURE: 130 MMHG | HEIGHT: 70 IN | OXYGEN SATURATION: 95 % | WEIGHT: 315 LBS | BODY MASS INDEX: 45.1 KG/M2 | HEART RATE: 50 BPM

## 2023-01-16 DIAGNOSIS — E66.01 MORBID OBESITY (H): ICD-10-CM

## 2023-01-16 DIAGNOSIS — I48.19 PERSISTENT ATRIAL FIBRILLATION (H): Primary | ICD-10-CM

## 2023-01-16 PROCEDURE — 93000 ELECTROCARDIOGRAM COMPLETE: CPT | Performed by: INTERNAL MEDICINE

## 2023-01-16 PROCEDURE — 99214 OFFICE O/P EST MOD 30 MIN: CPT | Performed by: INTERNAL MEDICINE

## 2023-01-16 NOTE — LETTER
1/16/2023    Pacheco Andino MD  [facility] [address] [facility] [address]    RE: Michael Murphy       Dear Colleague,     I had the pleasure of seeing Michael Murphy in the [facility].  PHYSICIAN NOTE:  This visit was completed in person at the [facility].      It was my pleasure seeing Mr. Michael Murphy in followup of atrial fibrillation.      Michael is a 57-year-old gentleman with the following chronic medical/cardiac issues:   A.  History of persistent atrial fibrillation and typical atrial flutter with tachycardia-induced cardiomyopathy.  IRAF after cardioversion and despite amiodarone.  Catheter ablation of AF and typical atrial flutter in 12/2016.  Paroxysmal AF after the EP procedure.  Maintained on flecainide and a beta-blocker.  [id number] score is 1.  On long-term anticoagulation because of history of DVT/PE.   B.  Tachycardia-induced cardiomyopathy with EF of 20%-30%.  EF normalized after restoration of sinus rhythm.   C.  Severe obesity.  Recent significant weight loss with diet.   D.  History of DVT/PE due to factor V Leiden mutation.  He had been on warfarin for years, around 2021 switched to rivaroxaban.   E.  DM type II, on metformin, lisinopril, statin.   F.  Sleep apnea, treated with CPAP.   G.  Dyslipidemia.     Michael has been feeling well.  Unfortunately, he has gained weight.  As far as he knows he has not had sustained AF.  He has occasional brief palpitation.  He completed a recent cardiac monitor which showed no AF.  He had rare ectopics, including 4 beat run of VT.    He continues to use his CPAP on a daily basis.  No chest pain, more than his typical BRODY, syncope, near syncope or other cardiac symptoms.      PHYSICAL EXAMINATION:  Vital signs: 130/72, 50 and regular, 157 kg, BMI 49.6  General:  in no apparent distress, Michael is significantly overweight  ENT/Mouth:  no nasal discharge.  Eyes:  normal conjunctivae.   Neck:  no thyromegaly or  lymphadenopathy.  Chest/Lungs:  patient is not dyspneic.  Lungs CTA, without rales or wheezing.    Cardiovascular: Difficult to discern JVP, rhythm is regular.  Heart sounds are distant, no obvious gallop, murmur or rub.    Abdomen: Severe abdominal obesity.  Soft, negative HJR.    Extremities: Mild edema  Skin:  no xanthelasma.  No facial laceration.  Neurologic:  alert & oriented x 3.  Normal arm motion bilateral, no tremors.    Vascular:  2+ carotids without bruits.  2+ radials.        DIAGNOSTIC STUDIES:  ECG: Sinus bradycardia,  ms      IMPRESSION:  1. Atrial fibrillation.  Used to be a persistent arrhythmia associated with CM, and was unresponsive to amiodarone and cardioversion.  After ablation it became infrequent & paroxysmal, well-controlled on flecainide. Not on concomitant AV node blocker because of bradycardia.  No AF on recent 14-day cardiac monitor.  Continue flecainide and Xarelto.  2. Severe obesity.  Unfortunately, a major risk factor for AF which I am afraid sooner or later will recur.  After making significant progress with weight loss in 2021, weight control has slipped.  He is now trying to lose weight.  This was strongly encouraged.  He inquired about injectable tirzepatide for weight management for the latter.  On dry weight yesterday this morning and therefore was a lot of stuff unfortunately drain and then probably frozen losing zazueta.  This would be okay to use from a cardiac standpoint.  3. HELEN.  She was congratulated on the routine use of CPAP.    RECOMMENDATIONS:  1. Continue current cardiac medications.  2. Lose weight.  3. Follow-up in 1 year with EP LEO.    It was my pleasure seeing this delightful patient.  Please feel free to call with any questions.   Total time spent today, including time for chart review and documentation, was 30 minutes.      Nathan Hooper MD, [facility]          Orders Placed This Encounter   Procedures     EKG 12-lead complete w/read - Clinics  (performed today)       No orders of the defined types were placed in this encounter.      There are no discontinued medications.      Encounter Diagnosis   Name Primary?     Persistent atrial fibrillation (H) Yes       CURRENT MEDICATIONS:  Current Outpatient Medications   Medication Sig Dispense Refill     carvedilol (COREG) 6.25 MG tablet Take 1 tablet (6.25 mg) by mouth 2 times daily (with meals) 180 tablet 0     flecainide (TAMBOCOR) 100 MG tablet Take 1 tablet (100 mg) by mouth 2 times daily 180 tablet 0     furosemide (LASIX) 20 MG tablet Take 20 mg by mouth daily        losartan (COZAAR) 25 MG tablet Take 1 tablet (25 mg) by mouth daily 90 tablet 3     metFORMIN (GLUCOPHAGE) 500 MG tablet Take 1 tablet (500 mg) by mouth daily (with breakfast) Take 2 tabs (1000 mg) with supper 60 tablet      order for [facility] DREAMSTATION     9-11 CM H20  FULL FACE AIRFIT F10 M       rivaroxaban ANTICOAGULANT (XARELTO) 20 MG TABS tablet Take 20 mg by mouth daily Prescribed by Dr Andino       simvastatin (ZOCOR) 40 MG tablet Take 40 mg by mouth At Bedtime         ALLERGIES     Allergies   Allergen Reactions     Lisinopril Cough     Cephalosporins Rash       PAST MEDICAL HISTORY:  Past Medical History:   Diagnosis Date     Abnormal pulmonary function test-Hx of abnormal sleep test 8/8/2016     Amblyopia      Atrial flutter (H)      Cardiomyopathy (H)      Decreased left ventricular function-EF 20-25% per echo 8-1-2016 8/8/2016     DVT (deep vein thrombosis) in pregnancy      Factor V Leiden (H) 8/8/2016     Obesity      Persistent atrial fibrillation (H) 2007     Pulmonary emboli (H) 2011     Sleep apnea        PAST SURGICAL HISTORY:  Past Surgical History:   Procedure Laterality Date     CARDIOVERSION  8/19/16    failed     CARDIOVERSION  9/27/16     EP ABLATION ATRIAL FLUTTER N/A 12/05/2016    typical atrial flutter with bidirectional block across the CT isthmus.     H ABLATION FOCAL AFIB  12/5/16       FAMILY HISTORY:  Family  "History   Problem Relation Age of Onset     Heart Disease Mother         stent placed     Strabismus Mother      Heart Disease Father         bypass x4     Cancer Father         lung     Strabismus Son      Glaucoma No family hx of      Macular Degeneration No family hx of        SOCIAL HISTORY:  Social History     Socioeconomic History     Marital status:      Spouse name: None     Number of children: None     Years of education: None     Highest education level: None   Tobacco Use     Smoking status: Never     Smokeless tobacco: Former   Substance and Sexual Activity     Alcohol use: Yes     Comment: occ   Other Topics Concern     Parent/sibling w/ CABG, MI or angioplasty before 65F 55M? No     Caffeine Concern No     Comment: occ. pop/tea daily     Sleep Concern No     Stress Concern Yes     Weight Concern Yes     Comment: trying to lose weight     Special Diet No     Exercise Yes     Comment: 20-40 min, walking on treadmill daily     Seat Belt Yes       Review of Systems:  Skin:          Eyes:         ENT:         Respiratory:          Cardiovascular:         Gastroenterology:        Genitourinary:         Musculoskeletal:         Neurologic:         Psychiatric:         Heme/Lymph/Imm:         Endocrine:           Physical Exam:  Vitals: /72   Pulse 50   Ht 1.778 m (5' 10\")   Wt (!) 156.9 kg (346 lb)   SpO2 95%   BMI 49.65 kg/m      Constitutional:           Skin:             Head:           Eyes:           Lymph:      ENT:           Neck:           Respiratory:            Cardiac:                                                           GI:           Extremities and Muscular Skeletal:                 Neurological:           Psych:           CC  No referring provider defined for this encounter.    Thank you for allowing me to participate in the care of your patient.      Sincerely,     Nathan Hooper MD     [facility]  "

## 2023-01-16 NOTE — PROGRESS NOTES
PHYSICIAN NOTE:  This visit was completed in person at the [facility].      It was my pleasure seeing . Michael Murphy in followup of atrial fibrillation.      Michael is a 57-year-old gentleman with the following chronic medical/cardiac issues:   A.  History of persistent atrial fibrillation and typical atrial flutter with tachycardia-induced cardiomyopathy.  IRAF after cardioversion and despite amiodarone.  Catheter ablation of AF and typical atrial flutter in 12/2016.  Paroxysmal AF after the EP procedure.  Maintained on flecainide and a beta-blocker.  [id number] score is 1.  On long-term anticoagulation because of history of DVT/PE.   B.  Tachycardia-induced cardiomyopathy with EF of 20%-30%.  EF normalized after restoration of sinus rhythm.   C.  Severe obesity.  Recent significant weight loss with diet.   D.  History of DVT/PE due to factor V Leiden mutation.  He had been on warfarin for years, around 2021 switched to rivaroxaban.   E.  DM type II, on metformin, lisinopril, statin.   F.  Sleep apnea, treated with CPAP.   G.  Dyslipidemia.     Michael has been feeling well.  Unfortunately, he has gained weight.  As far as he knows he has not had sustained AF.  He has occasional brief palpitation.  He completed a recent cardiac monitor which showed no AF.  He had rare ectopics, including 4 beat run of VT.    He continues to use his CPAP on a daily basis.  No chest pain, more than his typical BRODY, syncope, near syncope or other cardiac symptoms.      PHYSICAL EXAMINATION:  Vital signs: 130/72, 50 and regular, 157 kg, BMI 49.6  General:  in no apparent distress, Michael is significantly overweight  ENT/Mouth:  no nasal discharge.  Eyes:  normal conjunctivae.   Neck:  no thyromegaly or lymphadenopathy.  Chest/Lungs:  patient is not dyspneic.  Lungs CTA, without rales or wheezing.    Cardiovascular: Difficult to discern JVP, rhythm is regular.  Heart sounds are distant, no obvious gallop, murmur or rub.     Abdomen: Severe abdominal obesity.  Soft, negative HJR.    Extremities: Mild edema  Skin:  no xanthelasma.  No facial laceration.  Neurologic:  alert & oriented x 3.  Normal arm motion bilateral, no tremors.    Vascular:  2+ carotids without bruits.  2+ radials.        DIAGNOSTIC STUDIES:  ECG: Sinus bradycardia,  ms      IMPRESSION:  1. Atrial fibrillation.  Used to be a persistent arrhythmia associated with CM, and was unresponsive to amiodarone and cardioversion.  After ablation it became infrequent & paroxysmal, well-controlled on flecainide. Not on concomitant AV node blocker because of bradycardia.  No AF on recent 14-day cardiac monitor.  Continue flecainide and Xarelto.  2. Severe obesity.  Unfortunately, a major risk factor for AF which I am afraid sooner or later will recur.  After making significant progress with weight loss in 2021, weight control has slipped.  He is now trying to lose weight.  This was strongly encouraged.  He inquired about injectable tirzepatide for weight management for the latter.  On dry weight yesterday this morning and therefore was a lot of stuff unfortunately drain and then probably frozen losing zazueta.  This would be okay to use from a cardiac standpoint.  3. HELEN.  She was congratulated on the routine use of CPAP.    RECOMMENDATIONS:  1. Continue current cardiac medications.  2. Lose weight.  3. Follow-up in 1 year with EP LEO.    It was my pleasure seeing this delightful patient.  Please feel free to call with any questions.   Total time spent today, including time for chart review and documentation, was 30 minutes.      Nathan Hooper MD, [facility]          Orders Placed This Encounter   Procedures     EKG 12-lead complete w/read - Clinics (performed today)       No orders of the defined types were placed in this encounter.      There are no discontinued medications.      Encounter Diagnosis   Name Primary?     Persistent atrial fibrillation (H) Yes       CURRENT  MEDICATIONS:  Current Outpatient Medications   Medication Sig Dispense Refill     carvedilol (COREG) 6.25 MG tablet Take 1 tablet (6.25 mg) by mouth 2 times daily (with meals) 180 tablet 0     flecainide (TAMBOCOR) 100 MG tablet Take 1 tablet (100 mg) by mouth 2 times daily 180 tablet 0     furosemide (LASIX) 20 MG tablet Take 20 mg by mouth daily        losartan (COZAAR) 25 MG tablet Take 1 tablet (25 mg) by mouth daily 90 tablet 3     metFORMIN (GLUCOPHAGE) 500 MG tablet Take 1 tablet (500 mg) by mouth daily (with breakfast) Take 2 tabs (1000 mg) with supper 60 tablet      order for DME DREAMSTATION     9-11 CM H20  FULL FACE AIRFIT F10 M       rivaroxaban ANTICOAGULANT (XARELTO) 20 MG TABS tablet Take 20 mg by mouth daily Prescribed by Dr Andino       simvastatin (ZOCOR) 40 MG tablet Take 40 mg by mouth At Bedtime         ALLERGIES     Allergies   Allergen Reactions     Lisinopril Cough     Cephalosporins Rash       PAST MEDICAL HISTORY:  Past Medical History:   Diagnosis Date     Abnormal pulmonary function test-Hx of abnormal sleep test 8/8/2016     Amblyopia      Atrial flutter (H)      Cardiomyopathy (H)      Decreased left ventricular function-EF 20-25% per echo 8-1-2016 8/8/2016     DVT (deep vein thrombosis) in pregnancy      Factor V Leiden (H) 8/8/2016     Obesity      Persistent atrial fibrillation (H) 2007     Pulmonary emboli (H) 2011     Sleep apnea        PAST SURGICAL HISTORY:  Past Surgical History:   Procedure Laterality Date     CARDIOVERSION  8/19/16    failed     CARDIOVERSION  9/27/16     EP ABLATION ATRIAL FLUTTER N/A 12/05/2016    typical atrial flutter with bidirectional block across the CT isthmus.     H ABLATION FOCAL AFIB  12/5/16       FAMILY HISTORY:  Family History   Problem Relation Age of Onset     Heart Disease Mother         stent placed     Strabismus Mother      Heart Disease Father         bypass x4     Cancer Father         lung     Strabismus Son      Glaucoma No family hx  "of      Macular Degeneration No family hx of        SOCIAL HISTORY:  Social History     Socioeconomic History     Marital status:      Spouse name: None     Number of children: None     Years of education: None     Highest education level: None   Tobacco Use     Smoking status: Never     Smokeless tobacco: Former   Substance and Sexual Activity     Alcohol use: Yes     Comment: occ   Other Topics Concern     Parent/sibling w/ CABG, MI or angioplasty before 65F 55M? No     Caffeine Concern No     Comment: occ. pop/tea daily     Sleep Concern No     Stress Concern Yes     Weight Concern Yes     Comment: trying to lose weight     Special Diet No     Exercise Yes     Comment: 20-40 min, walking on treadmill daily     Seat Belt Yes       Review of Systems:  Skin:          Eyes:         ENT:         Respiratory:          Cardiovascular:         Gastroenterology:        Genitourinary:         Musculoskeletal:         Neurologic:         Psychiatric:         Heme/Lymph/Imm:         Endocrine:           Physical Exam:  Vitals: /72   Pulse 50   Ht 1.778 m (5' 10\")   Wt (!) 156.9 kg (346 lb)   SpO2 95%   BMI 49.65 kg/m      Constitutional:           Skin:             Head:           Eyes:           Lymph:      ENT:           Neck:           Respiratory:            Cardiac:                                                           GI:           Extremities and Muscular Skeletal:                 Neurological:           Psych:           CC  No referring provider defined for this encounter.              "

## 2023-06-17 ENCOUNTER — HEALTH MAINTENANCE LETTER (OUTPATIENT)
Age: 58
End: 2023-06-17

## 2023-07-12 ENCOUNTER — TRANSFERRED RECORDS (OUTPATIENT)
Dept: HEALTH INFORMATION MANAGEMENT | Facility: CLINIC | Age: 58
End: 2023-07-12
Payer: COMMERCIAL

## 2023-12-10 ENCOUNTER — MYC REFILL (OUTPATIENT)
Dept: CARDIOLOGY | Facility: CLINIC | Age: 58
End: 2023-12-10
Payer: COMMERCIAL

## 2023-12-10 DIAGNOSIS — I48.19 PERSISTENT ATRIAL FIBRILLATION (H): ICD-10-CM

## 2023-12-11 RX ORDER — CARVEDILOL 6.25 MG/1
6.25 TABLET ORAL 2 TIMES DAILY WITH MEALS
Qty: 180 TABLET | Refills: 0 | Status: SHIPPED | OUTPATIENT
Start: 2023-12-11 | End: 2024-02-08

## 2023-12-18 ENCOUNTER — MYC REFILL (OUTPATIENT)
Dept: CARDIOLOGY | Facility: CLINIC | Age: 58
End: 2023-12-18
Payer: COMMERCIAL

## 2023-12-18 DIAGNOSIS — I42.9 CARDIOMYOPATHY, UNSPECIFIED TYPE (H): ICD-10-CM

## 2023-12-19 ENCOUNTER — TELEPHONE (OUTPATIENT)
Dept: CARDIOLOGY | Facility: CLINIC | Age: 58
End: 2023-12-19
Payer: COMMERCIAL

## 2023-12-19 DIAGNOSIS — I48.0 PAROXYSMAL ATRIAL FIBRILLATION (H): Primary | ICD-10-CM

## 2023-12-19 RX ORDER — LOSARTAN POTASSIUM 25 MG/1
TABLET ORAL
Qty: 90 TABLET | Refills: 0 | Status: SHIPPED | OUTPATIENT
Start: 2023-12-19 | End: 2024-02-08

## 2023-12-19 NOTE — TELEPHONE ENCOUNTER
12/19/23 [facility] Refill Guide Reviewed     Received refill request for: Losartan 25 mg   Last OV was: 01/16/23  Labs/EKG: Labs 11/2019  F/U scheduled : OV and lab ordered not scheduled   Rx sent to: [facility]   Msg sent to scheduling to call ariane Stroud 9 am

## 2023-12-26 ENCOUNTER — TRANSFERRED RECORDS (OUTPATIENT)
Dept: HEALTH INFORMATION MANAGEMENT | Facility: CLINIC | Age: 58
End: 2023-12-26
Payer: COMMERCIAL

## 2024-01-07 ENCOUNTER — MYC REFILL (OUTPATIENT)
Dept: CARDIOLOGY | Facility: CLINIC | Age: 59
End: 2024-01-07
Payer: COMMERCIAL

## 2024-01-07 DIAGNOSIS — I48.19 PERSISTENT ATRIAL FIBRILLATION (H): ICD-10-CM

## 2024-01-08 RX ORDER — FLECAINIDE ACETATE 100 MG/1
100 TABLET ORAL 2 TIMES DAILY
Qty: 180 TABLET | Refills: 3 | Status: SHIPPED | OUTPATIENT
Start: 2024-01-08 | End: 2024-02-08

## 2024-02-08 ENCOUNTER — OFFICE VISIT (OUTPATIENT)
Dept: CARDIOLOGY | Facility: CLINIC | Age: 59
End: 2024-02-08
Payer: COMMERCIAL

## 2024-02-08 VITALS
SYSTOLIC BLOOD PRESSURE: 123 MMHG | HEART RATE: 58 BPM | HEIGHT: 70 IN | BODY MASS INDEX: 42.95 KG/M2 | DIASTOLIC BLOOD PRESSURE: 76 MMHG | WEIGHT: 300 LBS | OXYGEN SATURATION: 96 %

## 2024-02-08 DIAGNOSIS — I42.9 CARDIOMYOPATHY, UNSPECIFIED TYPE (H): ICD-10-CM

## 2024-02-08 DIAGNOSIS — I48.19 PERSISTENT ATRIAL FIBRILLATION (H): ICD-10-CM

## 2024-02-08 DIAGNOSIS — I48.0 PAROXYSMAL ATRIAL FIBRILLATION (H): Primary | ICD-10-CM

## 2024-02-08 PROCEDURE — 93000 ELECTROCARDIOGRAM COMPLETE: CPT | Performed by: NURSE PRACTITIONER

## 2024-02-08 PROCEDURE — 99213 OFFICE O/P EST LOW 20 MIN: CPT | Performed by: NURSE PRACTITIONER

## 2024-02-08 RX ORDER — CARVEDILOL 6.25 MG/1
6.25 TABLET ORAL 2 TIMES DAILY WITH MEALS
Qty: 180 TABLET | Refills: 3 | Status: SHIPPED | OUTPATIENT
Start: 2024-02-08

## 2024-02-08 RX ORDER — LOSARTAN POTASSIUM 25 MG/1
25 TABLET ORAL DAILY
Qty: 90 TABLET | Refills: 3 | Status: SHIPPED | OUTPATIENT
Start: 2024-02-08

## 2024-02-08 RX ORDER — FLECAINIDE ACETATE 100 MG/1
100 TABLET ORAL 2 TIMES DAILY
Qty: 180 TABLET | Refills: 3 | Status: SHIPPED | OUTPATIENT
Start: 2024-02-08

## 2024-02-08 RX ORDER — SEMAGLUTIDE 2.4 MG/.75ML
2.4 INJECTION, SOLUTION SUBCUTANEOUS
COMMUNITY
Start: 2023-12-26

## 2024-02-08 NOTE — PROGRESS NOTES
Electrophysiology Clinic Progress Note  Michael Murphy MRN# [id number]   YOB: 1965 Age: 58 year old     Primary cardiologist: Dr. Hooper    Reason for visit: Annual follow up    History of presenting illness:    Michael Murphy is a pleasant 58 year old patient with past medical history significant for:    Persistent atrial fibrillation and typical atrial flutter: Initially diagnosed in 2011 and was maintained on flecainide.  Recurrent a flutter with RVR and developed a tachycardia induced cardiomyopathy.  IRAF post DCCV despite amiodarone and ultimately underwent a PVI and CTI ablation in 12/2016.  Paroxysmal atrial fibrillation post ablation and was placed on flecainide and beta-blocker.  [id number] Score 3 (diabetes and history of DVT/PE) on long-term Xarelto 20 mg daily  Tachycardia induced cardiomyopathy: LVEF 20 to 30% and normalized after restoration of SR  History of DVT/PE secondary to factor V Leiden mutation: Previously maintained on warfarin and changed to Xarelto in 2021  Type 2 diabetes  HELEN on CPAP  Dyslipidemia  Obesity: BMI 43 and currently on Wegovy and thus far has lost approximately 45 pounds    Today he returns for his annual follow-up.  He has not had any breakthrough AF and is feeling well on his current cardiac medication regimen.  He has no concerns with Xarelto and denies any concerns.  He denies any chest discomfort or shortness of breath on exertion.  He does have mild bilateral lower extremity edema for which he takes daily Lasix and a as needed dose of 20 mg.    Diagnotic studies:  EKG (2/8/2024): SR 59 bpm, , , QTc 441, QRSd 110  Zio Patch (12/2022): 4 beats of VT, 3 episodes VT runs longest being 5 beats and no atrial fibrillation  Echocardiogram (2017): LVEF of 55 to 60% without wall motion or significant valvular abnormalities.  RV function was normal.  SANDRA (2016): LVEF of 25 to 30% with moderately decreased RV systolic function.            Assessment and  Plan:     ASSESSMENT:    Persistent atrial fibrillation and typical atrial flutter  Initially diagnosed in 2011 and was maintained on flecainide.    Recurrent a flutter with RVR and developed a tachycardia induced cardiomyopathy.    IRAF post DCCV despite amiodarone and ultimately underwent a PVI and CTI ablation in 12/2016.    Paroxysmal atrial fibrillation post ablation and was placed on flecainide 100 mg twice daily and carvedilol 6.25 mg daily  [id number] Score 3 (diabetes and history of DVT/PE) on long-term Xarelto 20 mg daily  Hemoglobin 15 and creatinine 0.8 from 12/26/2023    PLAN:     Continue present medical therapy including flecainide 100 mg every 12 hours, carvedilol 6.25 mg twice daily and losartan 25 mg daily as well as Xarelto 20 mg daily.  Return to clinic in 1 year with EKG or sooner if needed.  Of note, the patient states that Dr. Hooper is currently out of his network, but he would like to continue following up in this clinic.     Orders this Visit:  Orders Placed This Encounter   Procedures    Follow-Up with Cardiology LEO    EKG 12-lead complete w/read - Clinics (performed today)     Orders Placed This Encounter   Medications    DISCONTD: Semaglutide-Weight Management (WEGOVY) 1.7 MG/0.75ML pen     Sig: every week    WEGOVY 2.4 MG/0.75ML pen     Sig: Inject 2.4 mg Subcutaneous    carvedilol (COREG) 6.25 MG tablet     Sig: Take 1 tablet (6.25 mg) by mouth 2 times daily (with meals)     Dispense:  180 tablet     Refill:  3    flecainide (TAMBOCOR) 100 MG tablet     Sig: Take 1 tablet (100 mg) by mouth 2 times daily     Dispense:  180 tablet     Refill:  3    losartan (COZAAR) 25 MG tablet     Sig: Take 1 tablet (25 mg) by mouth daily     Dispense:  90 tablet     Refill:  3     Medications Discontinued During This Encounter   Medication Reason    Semaglutide-Weight Management (WEGOVY) 1.7 MG/0.75ML pen Erroneous Entry (No AVS)    carvedilol (COREG) 6.25 MG tablet Reorder (No AVS)    losartan  "(COZAAR) 25 MG tablet Reorder (No AVS)    flecainide (TAMBOCOR) 100 MG tablet Reorder (No AVS)       Today's clinic visit entailed:  Review of the result(s) of each unique test - EKG, echo, EPS, DCCV  Ordering of each unique test  Prescription drug management  20 minutes spent by me on the date of the encounter doing chart review, history and exam, documentation and further activities per the note  Provider  Link to [facility] Help Grid     The level of medical decision making during this visit was of moderate complexity.           Review of Systems:     Review of Systems:  Skin:        Eyes:       ENT:       Respiratory:       Cardiovascular:       Gastroenterology:      Genitourinary:       Musculoskeletal:       Neurologic:       Psychiatric:       Heme/Lymph/Imm:       Endocrine:                 Physical Exam:     Vitals: /76 (BP Location: Left arm, Patient Position: Sitting)   Pulse 58   Ht 1.778 m (5' 10\")   Wt 136.1 kg (300 lb)   SpO2 96%   BMI 43.05 kg/m    Constitutional: Well nourished and in no apparent distress.  Eyes: Pupils equal, round. Sclerae anicteric.   HEENT: Normocephalic, atraumatic.   Neck: Supple.   Respiratory: Breathing non-labored. Lungs clear to auscultation bilaterally. No crackles, wheezes, rhonchi, or rales.  Cardiovascular: Regular rate and rhythm, normal S1 and S2. No murmur, rub, or gallop.  Skin: Warm, dry. No rashes, cyanosis, or xanthelasma.  Extremities: No edema.  Neurologic: No gross motor deficits. Alert, awake, and oriented to person, place and time.  Psychiatric: Affect appropriate.        CURRENT MEDICATIONS:  Current Outpatient Medications   Medication Sig Dispense Refill    carvedilol (COREG) 6.25 MG tablet Take 1 tablet (6.25 mg) by mouth 2 times daily (with meals) 180 tablet 3    flecainide (TAMBOCOR) 100 MG tablet Take 1 tablet (100 mg) by mouth 2 times daily 180 tablet 3    furosemide (LASIX) 20 MG tablet Take 20 mg by mouth daily       losartan (COZAAR) 25 MG " tablet Take 1 tablet (25 mg) by mouth daily 90 tablet 3    metFORMIN (GLUCOPHAGE) 500 MG tablet Take 1 tablet (500 mg) by mouth daily (with breakfast) Take 2 tabs (1000 mg) with supper 60 tablet     order for DME DREAMSTATION     9-11 CM H20  FULL FACE AIRFIT F10 M      rivaroxaban ANTICOAGULANT (XARELTO) 20 MG TABS tablet Take 20 mg by mouth daily Prescribed by Dr Andino      simvastatin (ZOCOR) 40 MG tablet Take 40 mg by mouth At Bedtime      WEGOVY 2.4 MG/0.75ML pen Inject 2.4 mg Subcutaneous         ALLERGIES  Allergies   Allergen Reactions    Lisinopril Cough    Cephalosporins Rash         PAST MEDICAL HISTORY:  Past Medical History:   Diagnosis Date    Abnormal pulmonary function test-Hx of abnormal sleep test 8/8/2016    Amblyopia     Atrial flutter (H)     Cardiomyopathy (H)     Decreased left ventricular function-EF 20-25% per echo 8-1-2016 8/8/2016    DVT (deep vein thrombosis) in pregnancy     Factor V Leiden (H24) 8/8/2016    Obesity     Persistent atrial fibrillation (H) 2007    Pulmonary emboli (H) 2011    Sleep apnea        PAST SURGICAL HISTORY:  Past Surgical History:   Procedure Laterality Date    CARDIOVERSION  8/19/16    failed    CARDIOVERSION  9/27/16    EP ABLATION ATRIAL FLUTTER N/A 12/05/2016    typical atrial flutter with bidirectional block across the CT isthmus.    H ABLATION FOCAL AFIB  12/5/16       FAMILY HISTORY:  Family History   Problem Relation Age of Onset    Heart Disease Mother         stent placed    Strabismus Mother     Heart Disease Father         bypass x4    Cancer Father         lung    Strabismus Son     Glaucoma No family hx of     Macular Degeneration No family hx of        SOCIAL HISTORY:  Social History     Socioeconomic History    Marital status:      Spouse name: None    Number of children: None    Years of education: None    Highest education level: None   Tobacco Use    Smoking status: Never    Smokeless tobacco: Former   Substance and Sexual Activity     Alcohol use: Yes     Comment: occ   Other Topics Concern    Parent/sibling w/ CABG, MI or angioplasty before 65F 55M? No    Caffeine Concern No     Comment: occ. pop/tea daily    Sleep Concern No    Stress Concern Yes    Weight Concern Yes     Comment: trying to lose weight    Special Diet No    Exercise Yes     Comment: 20-40 min, walking on treadmill daily    Seat Belt Yes

## 2024-02-08 NOTE — LETTER
2/8/2024    Pacheco Andino MD  [facility] [address]  [facility] [address]    RE: Michael Murphy       Dear Colleague,     I had the pleasure of seeing Michael Murphy in the [facility].    Electrophysiology Clinic Progress Note  Michael Murphy MRN# [id number]   YOB: 1965 Age: 58 year old     Primary cardiologist: Dr. Hooper    Reason for visit: Annual follow up    History of presenting illness:    Michael Murphy is a pleasant 58 year old patient with past medical history significant for:    Persistent atrial fibrillation and typical atrial flutter: Initially diagnosed in 2011 and was maintained on flecainide.  Recurrent a flutter with RVR and developed a tachycardia induced cardiomyopathy.  IRAF post DCCV despite amiodarone and ultimately underwent a PVI and CTI ablation in 12/2016.  Paroxysmal atrial fibrillation post ablation and was placed on flecainide and beta-blocker.  [id number] Score 3 (diabetes and history of DVT/PE) on long-term Xarelto 20 mg daily  Tachycardia induced cardiomyopathy: LVEF 20 to 30% and normalized after restoration of SR  History of DVT/PE secondary to factor V Leiden mutation: Previously maintained on warfarin and changed to Xarelto in 2021  Type 2 diabetes  HELEN on CPAP  Dyslipidemia  Obesity: BMI 43 and currently on Wegovy and thus far has lost approximately 45 pounds    Today he returns for his annual follow-up.  He has not had any breakthrough AF and is feeling well on his current cardiac medication regimen.  He has no concerns with Xarelto and denies any concerns.  He denies any chest discomfort or shortness of breath on exertion.  He does have mild bilateral lower extremity edema for which he takes daily Lasix and a as needed dose of 20 mg.    Diagnotic studies:  EKG (2/8/2024): SR 59 bpm, , , QTc 441, QRSd 110  Zio Patch (12/2022): 4 beats of VT, 3 episodes VT runs longest being 5 beats and no atrial  fibrillation  Echocardiogram (2017): LVEF of 55 to 60% without wall motion or significant valvular abnormalities.  RV function was normal.  SANDRA (2016): LVEF of 25 to 30% with moderately decreased RV systolic function.            Assessment and Plan:     ASSESSMENT:    Persistent atrial fibrillation and typical atrial flutter  Initially diagnosed in 2011 and was maintained on flecainide.    Recurrent a flutter with RVR and developed a tachycardia induced cardiomyopathy.    IRAF post DCCV despite amiodarone and ultimately underwent a PVI and CTI ablation in 12/2016.    Paroxysmal atrial fibrillation post ablation and was placed on flecainide 100 mg twice daily and carvedilol 6.25 mg daily  [id number] Score 3 (diabetes and history of DVT/PE) on long-term Xarelto 20 mg daily  Hemoglobin 15 and creatinine 0.8 from 12/26/2023    PLAN:     Continue present medical therapy including flecainide 100 mg every 12 hours, carvedilol 6.25 mg twice daily and losartan 25 mg daily as well as Xarelto 20 mg daily.  Return to clinic in 1 year with EKG or sooner if needed.  Of note, the patient states that Dr. Hooper is currently out of his network, but he would like to continue following up in this clinic.     Orders this Visit:  Orders Placed This Encounter   Procedures    Follow-Up with Cardiology LEO    EKG 12-lead complete w/read - Clinics (performed today)     Orders Placed This Encounter   Medications    DISCONTD: Semaglutide-Weight Management (WEGOVY) 1.7 MG/0.75ML pen     Sig: every week    WEGOVY 2.4 MG/0.75ML pen     Sig: Inject 2.4 mg Subcutaneous    carvedilol (COREG) 6.25 MG tablet     Sig: Take 1 tablet (6.25 mg) by mouth 2 times daily (with meals)     Dispense:  180 tablet     Refill:  3    flecainide (TAMBOCOR) 100 MG tablet     Sig: Take 1 tablet (100 mg) by mouth 2 times daily     Dispense:  180 tablet     Refill:  3    losartan (COZAAR) 25 MG tablet     Sig: Take 1 tablet (25 mg) by mouth daily     Dispense:  90  "tablet     Refill:  3     Medications Discontinued During This Encounter   Medication Reason    Semaglutide-Weight Management (WEGOVY) 1.7 MG/0.75ML pen Erroneous Entry (No AVS)    carvedilol (COREG) 6.25 MG tablet Reorder (No AVS)    losartan (COZAAR) 25 MG tablet Reorder (No AVS)    flecainide (TAMBOCOR) 100 MG tablet Reorder (No AVS)       Today's clinic visit entailed:  Review of the result(s) of each unique test - EKG, echo, EPS, DCCV  Ordering of each unique test  Prescription drug management  20 minutes spent by me on the date of the encounter doing chart review, history and exam, documentation and further activities per the note  Provider  Link to [facility] Help Grid     The level of medical decision making during this visit was of moderate complexity.           Review of Systems:     Review of Systems:  Skin:        Eyes:       ENT:       Respiratory:       Cardiovascular:       Gastroenterology:      Genitourinary:       Musculoskeletal:       Neurologic:       Psychiatric:       Heme/Lymph/Imm:       Endocrine:                 Physical Exam:     Vitals: /76 (BP Location: Left arm, Patient Position: Sitting)   Pulse 58   Ht 1.778 m (5' 10\")   Wt 136.1 kg (300 lb)   SpO2 96%   BMI 43.05 kg/m    Constitutional: Well nourished and in no apparent distress.  Eyes: Pupils equal, round. Sclerae anicteric.   HEENT: Normocephalic, atraumatic.   Neck: Supple.   Respiratory: Breathing non-labored. Lungs clear to auscultation bilaterally. No crackles, wheezes, rhonchi, or rales.  Cardiovascular: Regular rate and rhythm, normal S1 and S2. No murmur, rub, or gallop.  Skin: Warm, dry. No rashes, cyanosis, or xanthelasma.  Extremities: No edema.  Neurologic: No gross motor deficits. Alert, awake, and oriented to person, place and time.  Psychiatric: Affect appropriate.        CURRENT MEDICATIONS:  Current Outpatient Medications   Medication Sig Dispense Refill    carvedilol (COREG) 6.25 MG tablet Take 1 tablet (6.25 " mg) by mouth 2 times daily (with meals) 180 tablet 3    flecainide (TAMBOCOR) 100 MG tablet Take 1 tablet (100 mg) by mouth 2 times daily 180 tablet 3    furosemide (LASIX) 20 MG tablet Take 20 mg by mouth daily       losartan (COZAAR) 25 MG tablet Take 1 tablet (25 mg) by mouth daily 90 tablet 3    metFORMIN (GLUCOPHAGE) 500 MG tablet Take 1 tablet (500 mg) by mouth daily (with breakfast) Take 2 tabs (1000 mg) with supper 60 tablet     order for DME DREAMSTATION     9-11 CM H20  FULL FACE AIRFIT F10 M      rivaroxaban ANTICOAGULANT (XARELTO) 20 MG TABS tablet Take 20 mg by mouth daily Prescribed by Dr Andino      simvastatin (ZOCOR) 40 MG tablet Take 40 mg by mouth At Bedtime      WEGOVY 2.4 MG/0.75ML pen Inject 2.4 mg Subcutaneous         ALLERGIES  Allergies   Allergen Reactions    Lisinopril Cough    Cephalosporins Rash         PAST MEDICAL HISTORY:  Past Medical History:   Diagnosis Date    Abnormal pulmonary function test-Hx of abnormal sleep test 8/8/2016    Amblyopia     Atrial flutter (H)     Cardiomyopathy (H)     Decreased left ventricular function-EF 20-25% per echo 8-1-2016 8/8/2016    DVT (deep vein thrombosis) in pregnancy     Factor V Leiden (H24) 8/8/2016    Obesity     Persistent atrial fibrillation (H) 2007    Pulmonary emboli (H) 2011    Sleep apnea        PAST SURGICAL HISTORY:  Past Surgical History:   Procedure Laterality Date    CARDIOVERSION  8/19/16    failed    CARDIOVERSION  9/27/16    EP ABLATION ATRIAL FLUTTER N/A 12/05/2016    typical atrial flutter with bidirectional block across the CT isthmus.    H ABLATION FOCAL AFIB  12/5/16       FAMILY HISTORY:  Family History   Problem Relation Age of Onset    Heart Disease Mother         stent placed    Strabismus Mother     Heart Disease Father         bypass x4    Cancer Father         lung    Strabismus Son     Glaucoma No family hx of     Macular Degeneration No family hx of        SOCIAL HISTORY:  Social History     Socioeconomic History     Marital status:      Spouse name: None    Number of children: None    Years of education: None    Highest education level: None   Tobacco Use    Smoking status: Never    Smokeless tobacco: Former   Substance and Sexual Activity    Alcohol use: Yes     Comment: occ   Other Topics Concern    Parent/sibling w/ CABG, MI or angioplasty before 65F 55M? No    Caffeine Concern No     Comment: occ. pop/tea daily    Sleep Concern No    Stress Concern Yes    Weight Concern Yes     Comment: trying to lose weight    Special Diet No    Exercise Yes     Comment: 20-40 min, walking on treadmill daily    Seat Belt Yes               Thank you for allowing me to participate in the care of your patient.      Sincerely,     BEKA Dumas [facility]  cc:   Nathan Hooper MD  [address]  [place] [address]

## 2024-08-10 ENCOUNTER — HEALTH MAINTENANCE LETTER (OUTPATIENT)
Age: 59
End: 2024-08-10

## 2025-03-11 NOTE — NURSING NOTE
Chief Complaints and History of Present Illnesses   Patient presents with     Consult For     Meningioma     Chief Complaint(s) and History of Present Illness(es)     Consult For     Laterality: both eyes    Associated symptoms: Negative for eye pain, double vision, headache, flashes and floaters    Comments: Meningioma              Comments     Michael Murphy is being seen today by the request of Dr. Geller for Meningioma.  Patient states that he was having HA first, notes that he has none currently, no vision symptoms.     Rebekah ZUNIGA December 9, 2019 7:28 AM                   Yes

## 2025-03-27 DIAGNOSIS — I42.9 CARDIOMYOPATHY, UNSPECIFIED TYPE (H): ICD-10-CM

## 2025-03-27 RX ORDER — LOSARTAN POTASSIUM 25 MG/1
25 TABLET ORAL DAILY
Qty: 90 TABLET | Refills: 0 | Status: SHIPPED | OUTPATIENT
Start: 2025-03-27

## 2025-05-05 ENCOUNTER — TELEPHONE (OUTPATIENT)
Dept: CARDIOLOGY | Facility: CLINIC | Age: 60
End: 2025-05-05
Payer: COMMERCIAL

## 2025-05-05 NOTE — TELEPHONE ENCOUNTER
Spoke to pt about visit with Dr Hooper on 5/12, to see if still wanting, since pt saw EP today at [facility]. Pt states that he at this time needs to go with [facility], but if things change he will reach back out.   OV cancelled. Nino

## 2025-08-16 ENCOUNTER — HEALTH MAINTENANCE LETTER (OUTPATIENT)
Age: 60
End: 2025-08-16